# Patient Record
Sex: FEMALE | Race: WHITE | Employment: OTHER | URBAN - METROPOLITAN AREA
[De-identification: names, ages, dates, MRNs, and addresses within clinical notes are randomized per-mention and may not be internally consistent; named-entity substitution may affect disease eponyms.]

---

## 2018-05-26 ENCOUNTER — APPOINTMENT (OUTPATIENT)
Dept: GENERAL RADIOLOGY | Age: 68
End: 2018-05-26
Payer: COMMERCIAL

## 2018-05-26 ENCOUNTER — HOSPITAL ENCOUNTER (EMERGENCY)
Age: 68
Discharge: HOME | End: 2018-05-26
Payer: COMMERCIAL

## 2018-05-26 VITALS
RESPIRATION RATE: 16 BRPM | OXYGEN SATURATION: 95 % | BODY MASS INDEX: 21.17 KG/M2 | HEART RATE: 72 BPM | HEIGHT: 59 IN | DIASTOLIC BLOOD PRESSURE: 69 MMHG | TEMPERATURE: 98.5 F | WEIGHT: 105 LBS | SYSTOLIC BLOOD PRESSURE: 133 MMHG

## 2018-05-26 DIAGNOSIS — S62.102A CLOSED FRACTURE OF LEFT WRIST, INITIAL ENCOUNTER: Primary | ICD-10-CM

## 2018-05-26 PROCEDURE — 6360000002 HC RX W HCPCS: Performed by: PHYSICIAN ASSISTANT

## 2018-05-26 PROCEDURE — 73100 X-RAY EXAM OF WRIST: CPT

## 2018-05-26 PROCEDURE — 6360000002 HC RX W HCPCS

## 2018-05-26 PROCEDURE — 73110 X-RAY EXAM OF WRIST: CPT

## 2018-05-26 PROCEDURE — 96372 THER/PROPH/DIAG INJ SC/IM: CPT

## 2018-05-26 PROCEDURE — 99284 EMERGENCY DEPT VISIT MOD MDM: CPT

## 2018-05-26 PROCEDURE — 6370000000 HC RX 637 (ALT 250 FOR IP): Performed by: PHYSICIAN ASSISTANT

## 2018-05-26 PROCEDURE — 2500000003 HC RX 250 WO HCPCS: Performed by: PHYSICIAN ASSISTANT

## 2018-05-26 RX ORDER — IBUPROFEN 400 MG/1
400 TABLET ORAL EVERY 6 HOURS PRN
COMMUNITY

## 2018-05-26 RX ORDER — BUPIVACAINE HYDROCHLORIDE 2.5 MG/ML
10 INJECTION, SOLUTION EPIDURAL; INFILTRATION; INTRACAUDAL ONCE
Status: COMPLETED | OUTPATIENT
Start: 2018-05-26 | End: 2018-05-26

## 2018-05-26 RX ORDER — FENTANYL CITRATE 50 UG/ML
INJECTION, SOLUTION INTRAMUSCULAR; INTRAVENOUS
Status: COMPLETED
Start: 2018-05-26 | End: 2018-05-26

## 2018-05-26 RX ORDER — FENTANYL CITRATE 50 UG/ML
50 INJECTION, SOLUTION INTRAMUSCULAR; INTRAVENOUS ONCE
Status: COMPLETED | OUTPATIENT
Start: 2018-05-26 | End: 2018-05-26

## 2018-05-26 RX ORDER — SUCRALFATE 1 G/1
1 TABLET ORAL 4 TIMES DAILY
COMMUNITY

## 2018-05-26 RX ORDER — ONDANSETRON 4 MG/1
4 TABLET, ORALLY DISINTEGRATING ORAL ONCE
Status: COMPLETED | OUTPATIENT
Start: 2018-05-26 | End: 2018-05-26

## 2018-05-26 RX ORDER — TRAMADOL HYDROCHLORIDE 50 MG/1
50 TABLET ORAL EVERY 6 HOURS PRN
Qty: 20 TABLET | Refills: 0 | Status: ON HOLD | OUTPATIENT
Start: 2018-05-26 | End: 2018-05-30

## 2018-05-26 RX ORDER — MORPHINE SULFATE 2 MG/ML
6 INJECTION, SOLUTION INTRAMUSCULAR; INTRAVENOUS ONCE
Status: COMPLETED | OUTPATIENT
Start: 2018-05-26 | End: 2018-05-26

## 2018-05-26 RX ORDER — OXYCODONE HYDROCHLORIDE AND ACETAMINOPHEN 5; 325 MG/1; MG/1
1 TABLET ORAL ONCE
Status: DISCONTINUED | OUTPATIENT
Start: 2018-05-26 | End: 2018-05-26

## 2018-05-26 RX ADMIN — BUPIVACAINE HYDROCHLORIDE 25 MG: 2.5 INJECTION, SOLUTION EPIDURAL; INFILTRATION; INTRACAUDAL; PERINEURAL at 16:35

## 2018-05-26 RX ADMIN — FENTANYL CITRATE 50 MCG: 50 INJECTION, SOLUTION INTRAMUSCULAR; INTRAVENOUS at 17:16

## 2018-05-26 RX ADMIN — ONDANSETRON 4 MG: 4 TABLET, ORALLY DISINTEGRATING ORAL at 16:21

## 2018-05-26 RX ADMIN — MORPHINE SULFATE 6 MG: 2 INJECTION, SOLUTION INTRAMUSCULAR; INTRAVENOUS at 16:34

## 2018-05-26 ASSESSMENT — PAIN DESCRIPTION - LOCATION
LOCATION: WRIST
LOCATION: WRIST

## 2018-05-26 ASSESSMENT — PAIN DESCRIPTION - FREQUENCY: FREQUENCY: CONTINUOUS

## 2018-05-26 ASSESSMENT — PAIN SCALES - GENERAL
PAINLEVEL_OUTOF10: 3
PAINLEVEL_OUTOF10: 10
PAINLEVEL_OUTOF10: 8
PAINLEVEL_OUTOF10: 10

## 2018-05-26 ASSESSMENT — PAIN DESCRIPTION - DESCRIPTORS: DESCRIPTORS: ACHING

## 2018-05-26 ASSESSMENT — PAIN DESCRIPTION - PAIN TYPE
TYPE: ACUTE PAIN
TYPE: ACUTE PAIN

## 2018-05-26 ASSESSMENT — PAIN DESCRIPTION - ORIENTATION
ORIENTATION: LEFT
ORIENTATION: LEFT

## 2018-05-26 ASSESSMENT — PAIN DESCRIPTION - ONSET: ONSET: SUDDEN

## 2018-05-29 ENCOUNTER — PREP FOR PROCEDURE (OUTPATIENT)
Dept: ORTHOPEDIC SURGERY | Age: 68
End: 2018-05-29

## 2018-05-29 ENCOUNTER — ANESTHESIA EVENT (OUTPATIENT)
Dept: OPERATING ROOM | Age: 68
End: 2018-05-29
Payer: COMMERCIAL

## 2018-05-29 ENCOUNTER — OFFICE VISIT (OUTPATIENT)
Dept: ORTHOPEDIC SURGERY | Age: 68
End: 2018-05-29
Payer: COMMERCIAL

## 2018-05-29 VITALS
SYSTOLIC BLOOD PRESSURE: 148 MMHG | DIASTOLIC BLOOD PRESSURE: 72 MMHG | TEMPERATURE: 98.5 F | RESPIRATION RATE: 20 BRPM | WEIGHT: 105 LBS | HEART RATE: 62 BPM | BODY MASS INDEX: 21.17 KG/M2 | HEIGHT: 59 IN

## 2018-05-29 DIAGNOSIS — S52.502A CLOSED TRAUMATIC DISPLACED FRACTURE OF DISTAL END OF LEFT RADIUS, INITIAL ENCOUNTER: Primary | ICD-10-CM

## 2018-05-29 PROCEDURE — 99203 OFFICE O/P NEW LOW 30 MIN: CPT | Performed by: ORTHOPAEDIC SURGERY

## 2018-05-29 RX ORDER — SODIUM CHLORIDE 0.9 % (FLUSH) 0.9 %
10 SYRINGE (ML) INJECTION EVERY 12 HOURS SCHEDULED
Status: CANCELLED | OUTPATIENT
Start: 2018-05-29

## 2018-05-29 RX ORDER — SODIUM CHLORIDE 9 MG/ML
INJECTION, SOLUTION INTRAVENOUS CONTINUOUS
Status: CANCELLED | OUTPATIENT
Start: 2018-05-29

## 2018-05-29 RX ORDER — SODIUM CHLORIDE 0.9 % (FLUSH) 0.9 %
10 SYRINGE (ML) INJECTION PRN
Status: CANCELLED | OUTPATIENT
Start: 2018-05-29

## 2018-05-30 ENCOUNTER — ANESTHESIA (OUTPATIENT)
Dept: OPERATING ROOM | Age: 68
End: 2018-05-30
Payer: COMMERCIAL

## 2018-05-30 ENCOUNTER — HOSPITAL ENCOUNTER (OUTPATIENT)
Age: 68
Setting detail: OUTPATIENT SURGERY
Discharge: HOME | End: 2018-05-30
Attending: ORTHOPAEDIC SURGERY | Admitting: ORTHOPAEDIC SURGERY
Payer: COMMERCIAL

## 2018-05-30 ENCOUNTER — HOSPITAL ENCOUNTER (OUTPATIENT)
Dept: GENERAL RADIOLOGY | Age: 68
Discharge: HOME | End: 2018-06-01
Attending: ORTHOPAEDIC SURGERY
Payer: COMMERCIAL

## 2018-05-30 VITALS
TEMPERATURE: 98.2 F | BODY MASS INDEX: 21.17 KG/M2 | HEIGHT: 59 IN | WEIGHT: 105 LBS | SYSTOLIC BLOOD PRESSURE: 169 MMHG | RESPIRATION RATE: 20 BRPM | OXYGEN SATURATION: 98 % | HEART RATE: 67 BPM | DIASTOLIC BLOOD PRESSURE: 70 MMHG

## 2018-05-30 VITALS
DIASTOLIC BLOOD PRESSURE: 119 MMHG | TEMPERATURE: 95.7 F | SYSTOLIC BLOOD PRESSURE: 176 MMHG | OXYGEN SATURATION: 99 % | RESPIRATION RATE: 1 BRPM

## 2018-05-30 DIAGNOSIS — R52 PAIN: ICD-10-CM

## 2018-05-30 DIAGNOSIS — S62.102A CLOSED FRACTURE OF LEFT WRIST, INITIAL ENCOUNTER: ICD-10-CM

## 2018-05-30 LAB
ANION GAP SERPL CALCULATED.3IONS-SCNC: 13 MMOL/L (ref 7–16)
BUN BLDV-MCNC: 13 MG/DL (ref 8–23)
CALCIUM SERPL-MCNC: 9.1 MG/DL (ref 8.6–10.2)
CHLORIDE BLD-SCNC: 103 MMOL/L (ref 98–107)
CO2: 23 MMOL/L (ref 22–29)
CREAT SERPL-MCNC: 0.6 MG/DL (ref 0.5–1)
EKG ATRIAL RATE: 52 BPM
EKG P AXIS: 20 DEGREES
EKG P-R INTERVAL: 178 MS
EKG Q-T INTERVAL: 456 MS
EKG QRS DURATION: 90 MS
EKG QTC CALCULATION (BAZETT): 424 MS
EKG R AXIS: -13 DEGREES
EKG T AXIS: 5 DEGREES
EKG VENTRICULAR RATE: 52 BPM
GFR AFRICAN AMERICAN: >60
GFR NON-AFRICAN AMERICAN: >60 ML/MIN/1.73
GLUCOSE BLD-MCNC: 97 MG/DL (ref 74–109)
HCT VFR BLD CALC: 37.8 % (ref 34–48)
HEMOGLOBIN: 12.5 G/DL (ref 11.5–15.5)
MCH RBC QN AUTO: 31.1 PG (ref 26–35)
MCHC RBC AUTO-ENTMCNC: 33.1 % (ref 32–34.5)
MCV RBC AUTO: 94 FL (ref 80–99.9)
PDW BLD-RTO: 13.6 FL (ref 11.5–15)
PLATELET # BLD: 213 E9/L (ref 130–450)
PMV BLD AUTO: 10.4 FL (ref 7–12)
POTASSIUM REFLEX MAGNESIUM: 4 MMOL/L (ref 3.5–5)
RBC # BLD: 4.02 E12/L (ref 3.5–5.5)
SODIUM BLD-SCNC: 139 MMOL/L (ref 132–146)
WBC # BLD: 6.7 E9/L (ref 4.5–11.5)

## 2018-05-30 PROCEDURE — 25609 OPTX DST RD XART FX/EP SEP3+: CPT | Performed by: PHYSICIAN ASSISTANT

## 2018-05-30 PROCEDURE — 7100000000 HC PACU RECOVERY - FIRST 15 MIN: Performed by: ORTHOPAEDIC SURGERY

## 2018-05-30 PROCEDURE — 80048 BASIC METABOLIC PNL TOTAL CA: CPT

## 2018-05-30 PROCEDURE — 6360000002 HC RX W HCPCS: Performed by: ANESTHESIOLOGY

## 2018-05-30 PROCEDURE — 6360000002 HC RX W HCPCS

## 2018-05-30 PROCEDURE — 3600000013 HC SURGERY LEVEL 3 ADDTL 15MIN: Performed by: ORTHOPAEDIC SURGERY

## 2018-05-30 PROCEDURE — 25609 OPTX DST RD XART FX/EP SEP3+: CPT | Performed by: ORTHOPAEDIC SURGERY

## 2018-05-30 PROCEDURE — 3600000003 HC SURGERY LEVEL 3 BASE: Performed by: ORTHOPAEDIC SURGERY

## 2018-05-30 PROCEDURE — 3700000000 HC ANESTHESIA ATTENDED CARE: Performed by: ORTHOPAEDIC SURGERY

## 2018-05-30 PROCEDURE — A4565 SLINGS: HCPCS | Performed by: ORTHOPAEDIC SURGERY

## 2018-05-30 PROCEDURE — 36415 COLL VENOUS BLD VENIPUNCTURE: CPT

## 2018-05-30 PROCEDURE — 2709999900 HC NON-CHARGEABLE SUPPLY: Performed by: ORTHOPAEDIC SURGERY

## 2018-05-30 PROCEDURE — 2580000003 HC RX 258: Performed by: PHYSICIAN ASSISTANT

## 2018-05-30 PROCEDURE — 7100000011 HC PHASE II RECOVERY - ADDTL 15 MIN: Performed by: ORTHOPAEDIC SURGERY

## 2018-05-30 PROCEDURE — 85027 COMPLETE CBC AUTOMATED: CPT

## 2018-05-30 PROCEDURE — 3700000001 HC ADD 15 MINUTES (ANESTHESIA): Performed by: ORTHOPAEDIC SURGERY

## 2018-05-30 PROCEDURE — 6370000000 HC RX 637 (ALT 250 FOR IP): Performed by: ANESTHESIOLOGY

## 2018-05-30 PROCEDURE — 2500000003 HC RX 250 WO HCPCS: Performed by: ORTHOPAEDIC SURGERY

## 2018-05-30 PROCEDURE — 3209999900 FLUORO FOR SURGICAL PROCEDURES

## 2018-05-30 PROCEDURE — 2500000003 HC RX 250 WO HCPCS

## 2018-05-30 PROCEDURE — 7100000001 HC PACU RECOVERY - ADDTL 15 MIN: Performed by: ORTHOPAEDIC SURGERY

## 2018-05-30 PROCEDURE — 6360000002 HC RX W HCPCS: Performed by: PHYSICIAN ASSISTANT

## 2018-05-30 PROCEDURE — C1713 ANCHOR/SCREW BN/BN,TIS/BN: HCPCS | Performed by: ORTHOPAEDIC SURGERY

## 2018-05-30 PROCEDURE — 7100000010 HC PHASE II RECOVERY - FIRST 15 MIN: Performed by: ORTHOPAEDIC SURGERY

## 2018-05-30 PROCEDURE — 2720000010 HC SURG SUPPLY STERILE: Performed by: ORTHOPAEDIC SURGERY

## 2018-05-30 DEVICE — SCREW BNE L18MM DIA2.7MM DST RAD LOK FULL THRD SQ DRV HD LO: Type: IMPLANTABLE DEVICE | Site: WRIST | Status: FUNCTIONAL

## 2018-05-30 DEVICE — SCREW BONE L11MM D2.7MM DST RDL NNLCKNG FLLY THRDD SQRE DRIV: Type: IMPLANTABLE DEVICE | Site: WRIST | Status: FUNCTIONAL

## 2018-05-30 DEVICE — SCREW BNE L12MM DIA2.7MM DST RAD NONLOCKING FULL THRD SQ: Type: IMPLANTABLE DEVICE | Site: WRIST | Status: FUNCTIONAL

## 2018-05-30 DEVICE — PEG BNE FIX L16MM DIA2.2MM DST VOLAR RAD SMOOTH LOK FOR DVR: Type: IMPLANTABLE DEVICE | Site: WRIST | Status: FUNCTIONAL

## 2018-05-30 DEVICE — PLATE BNE W22XL51MM 12 H L DST RAD TI LOK COMPR LO PROF NAR: Type: IMPLANTABLE DEVICE | Site: WRIST | Status: FUNCTIONAL

## 2018-05-30 DEVICE — SCREW BNE L16MM DIA2.7MM DST RAD LOK FULL THRD SQ DRV HD LO: Type: IMPLANTABLE DEVICE | Site: WRIST | Status: FUNCTIONAL

## 2018-05-30 DEVICE — IMPLANTABLE DEVICE
Type: IMPLANTABLE DEVICE | Site: WRIST | Status: FUNCTIONAL
Brand: CORTICAL LOCK SCREW

## 2018-05-30 RX ORDER — DIPHENHYDRAMINE HYDROCHLORIDE 50 MG/ML
12.5 INJECTION INTRAMUSCULAR; INTRAVENOUS
Status: DISCONTINUED | OUTPATIENT
Start: 2018-05-30 | End: 2018-05-30 | Stop reason: HOSPADM

## 2018-05-30 RX ORDER — FENTANYL CITRATE 50 UG/ML
50 INJECTION, SOLUTION INTRAMUSCULAR; INTRAVENOUS EVERY 5 MIN PRN
Status: COMPLETED | OUTPATIENT
Start: 2018-05-30 | End: 2018-05-30

## 2018-05-30 RX ORDER — FENTANYL CITRATE 50 UG/ML
25 INJECTION, SOLUTION INTRAMUSCULAR; INTRAVENOUS EVERY 5 MIN PRN
Status: DISCONTINUED | OUTPATIENT
Start: 2018-05-30 | End: 2018-05-30 | Stop reason: HOSPADM

## 2018-05-30 RX ORDER — TRAMADOL HYDROCHLORIDE 50 MG/1
50 TABLET ORAL EVERY 6 HOURS PRN
Qty: 28 TABLET | Refills: 0 | Status: SHIPPED | OUTPATIENT
Start: 2018-05-30 | End: 2018-06-11 | Stop reason: SDUPTHER

## 2018-05-30 RX ORDER — KETAMINE HYDROCHLORIDE 10 MG/ML
INJECTION, SOLUTION INTRAMUSCULAR; INTRAVENOUS PRN
Status: DISCONTINUED | OUTPATIENT
Start: 2018-05-30 | End: 2018-05-30 | Stop reason: SDUPTHER

## 2018-05-30 RX ORDER — LIDOCAINE HYDROCHLORIDE 20 MG/ML
INJECTION, SOLUTION INFILTRATION; PERINEURAL PRN
Status: DISCONTINUED | OUTPATIENT
Start: 2018-05-30 | End: 2018-05-30 | Stop reason: SDUPTHER

## 2018-05-30 RX ORDER — ROCURONIUM BROMIDE 10 MG/ML
INJECTION, SOLUTION INTRAVENOUS PRN
Status: DISCONTINUED | OUTPATIENT
Start: 2018-05-30 | End: 2018-05-30 | Stop reason: SDUPTHER

## 2018-05-30 RX ORDER — BUPIVACAINE HYDROCHLORIDE AND EPINEPHRINE 5; 5 MG/ML; UG/ML
INJECTION, SOLUTION EPIDURAL; INTRACAUDAL; PERINEURAL PRN
Status: DISCONTINUED | OUTPATIENT
Start: 2018-05-30 | End: 2018-05-30 | Stop reason: HOSPADM

## 2018-05-30 RX ORDER — SODIUM CHLORIDE 9 MG/ML
INJECTION, SOLUTION INTRAVENOUS CONTINUOUS
Status: DISCONTINUED | OUTPATIENT
Start: 2018-05-30 | End: 2018-05-30 | Stop reason: HOSPADM

## 2018-05-30 RX ORDER — ONDANSETRON 2 MG/ML
INJECTION INTRAMUSCULAR; INTRAVENOUS PRN
Status: DISCONTINUED | OUTPATIENT
Start: 2018-05-30 | End: 2018-05-30 | Stop reason: SDUPTHER

## 2018-05-30 RX ORDER — OXYCODONE HYDROCHLORIDE AND ACETAMINOPHEN 5; 325 MG/1; MG/1
1 TABLET ORAL
Status: DISCONTINUED | OUTPATIENT
Start: 2018-05-30 | End: 2018-05-30

## 2018-05-30 RX ORDER — SODIUM CHLORIDE 0.9 % (FLUSH) 0.9 %
10 SYRINGE (ML) INJECTION PRN
Status: DISCONTINUED | OUTPATIENT
Start: 2018-05-30 | End: 2018-05-30 | Stop reason: HOSPADM

## 2018-05-30 RX ORDER — MEPERIDINE HYDROCHLORIDE 25 MG/ML
12.5 INJECTION INTRAMUSCULAR; INTRAVENOUS; SUBCUTANEOUS EVERY 5 MIN PRN
Status: DISCONTINUED | OUTPATIENT
Start: 2018-05-30 | End: 2018-05-30 | Stop reason: HOSPADM

## 2018-05-30 RX ORDER — MIDAZOLAM HYDROCHLORIDE 1 MG/ML
INJECTION INTRAMUSCULAR; INTRAVENOUS PRN
Status: DISCONTINUED | OUTPATIENT
Start: 2018-05-30 | End: 2018-05-30 | Stop reason: SDUPTHER

## 2018-05-30 RX ORDER — PROMETHAZINE HYDROCHLORIDE 25 MG/ML
6.25 INJECTION, SOLUTION INTRAMUSCULAR; INTRAVENOUS
Status: DISCONTINUED | OUTPATIENT
Start: 2018-05-30 | End: 2018-05-30 | Stop reason: HOSPADM

## 2018-05-30 RX ORDER — GLYCOPYRROLATE 0.2 MG/ML
INJECTION INTRAMUSCULAR; INTRAVENOUS PRN
Status: DISCONTINUED | OUTPATIENT
Start: 2018-05-30 | End: 2018-05-30 | Stop reason: SDUPTHER

## 2018-05-30 RX ORDER — FENTANYL CITRATE 50 UG/ML
INJECTION, SOLUTION INTRAMUSCULAR; INTRAVENOUS PRN
Status: DISCONTINUED | OUTPATIENT
Start: 2018-05-30 | End: 2018-05-30 | Stop reason: SDUPTHER

## 2018-05-30 RX ORDER — PROPOFOL 10 MG/ML
INJECTION, EMULSION INTRAVENOUS PRN
Status: DISCONTINUED | OUTPATIENT
Start: 2018-05-30 | End: 2018-05-30 | Stop reason: SDUPTHER

## 2018-05-30 RX ORDER — NEOSTIGMINE METHYLSULFATE 1 MG/ML
INJECTION, SOLUTION INTRAVENOUS PRN
Status: DISCONTINUED | OUTPATIENT
Start: 2018-05-30 | End: 2018-05-30 | Stop reason: SDUPTHER

## 2018-05-30 RX ORDER — DEXAMETHASONE SODIUM PHOSPHATE 4 MG/ML
INJECTION, SOLUTION INTRA-ARTICULAR; INTRALESIONAL; INTRAMUSCULAR; INTRAVENOUS; SOFT TISSUE PRN
Status: DISCONTINUED | OUTPATIENT
Start: 2018-05-30 | End: 2018-05-30 | Stop reason: SDUPTHER

## 2018-05-30 RX ORDER — SODIUM CHLORIDE 0.9 % (FLUSH) 0.9 %
10 SYRINGE (ML) INJECTION EVERY 12 HOURS SCHEDULED
Status: DISCONTINUED | OUTPATIENT
Start: 2018-05-30 | End: 2018-05-30 | Stop reason: HOSPADM

## 2018-05-30 RX ORDER — TRAMADOL HYDROCHLORIDE 50 MG/1
50 TABLET ORAL ONCE
Status: COMPLETED | OUTPATIENT
Start: 2018-05-30 | End: 2018-05-30

## 2018-05-30 RX ADMIN — FENTANYL CITRATE 50 MCG: 50 INJECTION, SOLUTION INTRAMUSCULAR; INTRAVENOUS at 14:02

## 2018-05-30 RX ADMIN — FENTANYL CITRATE 50 MCG: 50 INJECTION, SOLUTION INTRAMUSCULAR; INTRAVENOUS at 13:24

## 2018-05-30 RX ADMIN — ONDANSETRON 4 MG: 2 INJECTION, SOLUTION INTRAMUSCULAR; INTRAVENOUS at 12:26

## 2018-05-30 RX ADMIN — FENTANYL CITRATE 100 MCG: 50 INJECTION, SOLUTION INTRAMUSCULAR; INTRAVENOUS at 12:09

## 2018-05-30 RX ADMIN — KETAMINE HYDROCHLORIDE 20 MG: 10 INJECTION INTRAMUSCULAR; INTRAVENOUS at 12:12

## 2018-05-30 RX ADMIN — CEFAZOLIN SODIUM 2 G: 2 SOLUTION INTRAVENOUS at 12:05

## 2018-05-30 RX ADMIN — MIDAZOLAM 2 MG: 1 INJECTION INTRAMUSCULAR; INTRAVENOUS at 12:05

## 2018-05-30 RX ADMIN — SODIUM CHLORIDE: 9 INJECTION, SOLUTION INTRAVENOUS at 09:02

## 2018-05-30 RX ADMIN — PROPOFOL 100 MG: 10 INJECTION, EMULSION INTRAVENOUS at 12:09

## 2018-05-30 RX ADMIN — FENTANYL CITRATE 50 MCG: 50 INJECTION, SOLUTION INTRAMUSCULAR; INTRAVENOUS at 13:48

## 2018-05-30 RX ADMIN — Medication 0.4 MG: at 13:11

## 2018-05-30 RX ADMIN — FENTANYL CITRATE 50 MCG: 50 INJECTION, SOLUTION INTRAMUSCULAR; INTRAVENOUS at 13:33

## 2018-05-30 RX ADMIN — Medication 2 MG: at 13:11

## 2018-05-30 RX ADMIN — LIDOCAINE HYDROCHLORIDE 60 MG: 20 INJECTION, SOLUTION INFILTRATION; PERINEURAL at 12:09

## 2018-05-30 RX ADMIN — ROCURONIUM BROMIDE 30 MG: 10 INJECTION, SOLUTION INTRAVENOUS at 12:09

## 2018-05-30 RX ADMIN — FENTANYL CITRATE 50 MCG: 50 INJECTION, SOLUTION INTRAMUSCULAR; INTRAVENOUS at 13:40

## 2018-05-30 RX ADMIN — TRAMADOL HYDROCHLORIDE 50 MG: 50 TABLET, FILM COATED ORAL at 15:26

## 2018-05-30 RX ADMIN — DEXAMETHASONE SODIUM PHOSPHATE 8 MG: 4 INJECTION, SOLUTION INTRA-ARTICULAR; INTRALESIONAL; INTRAMUSCULAR; INTRAVENOUS; SOFT TISSUE at 12:09

## 2018-05-30 RX ADMIN — SODIUM CHLORIDE: 9 INJECTION, SOLUTION INTRAVENOUS at 12:05

## 2018-05-30 ASSESSMENT — PULMONARY FUNCTION TESTS
PIF_VALUE: 3
PIF_VALUE: 23
PIF_VALUE: 23
PIF_VALUE: 0
PIF_VALUE: 21
PIF_VALUE: 20
PIF_VALUE: 21
PIF_VALUE: 21
PIF_VALUE: 23
PIF_VALUE: 21
PIF_VALUE: 21
PIF_VALUE: 23
PIF_VALUE: 2
PIF_VALUE: 16
PIF_VALUE: 21
PIF_VALUE: 26
PIF_VALUE: 21
PIF_VALUE: 23
PIF_VALUE: 20
PIF_VALUE: 21
PIF_VALUE: 20
PIF_VALUE: 21
PIF_VALUE: 20
PIF_VALUE: 23
PIF_VALUE: 21
PIF_VALUE: 17
PIF_VALUE: 25
PIF_VALUE: 23
PIF_VALUE: 15
PIF_VALUE: 21
PIF_VALUE: 21
PIF_VALUE: 20
PIF_VALUE: 20
PIF_VALUE: 21
PIF_VALUE: 1
PIF_VALUE: 21
PIF_VALUE: 23
PIF_VALUE: 21
PIF_VALUE: 23
PIF_VALUE: 21
PIF_VALUE: 21
PIF_VALUE: 20
PIF_VALUE: 5
PIF_VALUE: 20
PIF_VALUE: 20
PIF_VALUE: 21
PIF_VALUE: 20
PIF_VALUE: 1
PIF_VALUE: 21
PIF_VALUE: 3
PIF_VALUE: 21
PIF_VALUE: 1
PIF_VALUE: 21
PIF_VALUE: 21
PIF_VALUE: 1
PIF_VALUE: 23
PIF_VALUE: 20
PIF_VALUE: 17
PIF_VALUE: 21
PIF_VALUE: 23
PIF_VALUE: 4

## 2018-05-30 ASSESSMENT — PAIN DESCRIPTION - DESCRIPTORS
DESCRIPTORS: DISCOMFORT;ACHING
DESCRIPTORS: DISCOMFORT

## 2018-05-30 ASSESSMENT — PAIN DESCRIPTION - LOCATION
LOCATION: ARM

## 2018-05-30 ASSESSMENT — PAIN DESCRIPTION - PAIN TYPE
TYPE: SURGICAL PAIN

## 2018-05-30 ASSESSMENT — PAIN DESCRIPTION - PROGRESSION
CLINICAL_PROGRESSION: GRADUALLY IMPROVING
CLINICAL_PROGRESSION: GRADUALLY IMPROVING

## 2018-05-30 ASSESSMENT — PAIN SCALES - GENERAL
PAINLEVEL_OUTOF10: 6
PAINLEVEL_OUTOF10: 8
PAINLEVEL_OUTOF10: 5
PAINLEVEL_OUTOF10: 8
PAINLEVEL_OUTOF10: 7
PAINLEVEL_OUTOF10: 10
PAINLEVEL_OUTOF10: 7
PAINLEVEL_OUTOF10: 7

## 2018-05-30 ASSESSMENT — LIFESTYLE VARIABLES: SMOKING_STATUS: 0

## 2018-05-30 ASSESSMENT — PAIN DESCRIPTION - ORIENTATION
ORIENTATION: LEFT

## 2018-05-30 ASSESSMENT — PAIN DESCRIPTION - FREQUENCY: FREQUENCY: CONTINUOUS

## 2018-05-30 ASSESSMENT — PAIN - FUNCTIONAL ASSESSMENT: PAIN_FUNCTIONAL_ASSESSMENT: 0-10

## 2018-05-30 ASSESSMENT — PAIN DESCRIPTION - ONSET: ONSET: ON-GOING

## 2018-06-08 ENCOUNTER — TELEPHONE (OUTPATIENT)
Dept: ORTHOPEDIC SURGERY | Age: 68
End: 2018-06-08

## 2018-06-08 DIAGNOSIS — S52.502A CLOSED TRAUMATIC DISPLACED FRACTURE OF DISTAL END OF LEFT RADIUS, INITIAL ENCOUNTER: Primary | ICD-10-CM

## 2018-06-11 ENCOUNTER — OFFICE VISIT (OUTPATIENT)
Dept: ORTHOPEDIC SURGERY | Age: 68
End: 2018-06-11
Payer: COMMERCIAL

## 2018-06-11 VITALS
SYSTOLIC BLOOD PRESSURE: 145 MMHG | TEMPERATURE: 98.6 F | DIASTOLIC BLOOD PRESSURE: 74 MMHG | HEART RATE: 59 BPM | RESPIRATION RATE: 16 BRPM

## 2018-06-11 DIAGNOSIS — S62.102A CLOSED FRACTURE OF LEFT WRIST, INITIAL ENCOUNTER: ICD-10-CM

## 2018-06-11 DIAGNOSIS — S52.502A CLOSED TRAUMATIC DISPLACED FRACTURE OF DISTAL END OF LEFT RADIUS, INITIAL ENCOUNTER: Primary | ICD-10-CM

## 2018-06-11 PROCEDURE — L3908 WHO COCK-UP NONMOLDE PRE OTS: HCPCS | Performed by: ORTHOPAEDIC SURGERY

## 2018-06-11 PROCEDURE — 99024 POSTOP FOLLOW-UP VISIT: CPT | Performed by: ORTHOPAEDIC SURGERY

## 2018-06-11 RX ORDER — ESTRADIOL 0.05 MG/D
PATCH TRANSDERMAL
COMMUNITY
Start: 2018-06-07

## 2018-06-11 RX ORDER — TRAMADOL HYDROCHLORIDE 50 MG/1
50 TABLET ORAL EVERY 6 HOURS PRN
Qty: 28 TABLET | Refills: 0 | Status: SHIPPED | OUTPATIENT
Start: 2018-06-11 | End: 2018-06-18

## 2018-07-10 ENCOUNTER — OFFICE VISIT (OUTPATIENT)
Dept: ORTHOPEDIC SURGERY | Age: 68
End: 2018-07-10

## 2018-07-10 VITALS
SYSTOLIC BLOOD PRESSURE: 125 MMHG | TEMPERATURE: 98.5 F | DIASTOLIC BLOOD PRESSURE: 68 MMHG | HEIGHT: 59 IN | BODY MASS INDEX: 21.17 KG/M2 | RESPIRATION RATE: 16 BRPM | WEIGHT: 105 LBS | HEART RATE: 62 BPM

## 2018-07-10 DIAGNOSIS — S52.502A CLOSED TRAUMATIC DISPLACED FRACTURE OF DISTAL END OF LEFT RADIUS, INITIAL ENCOUNTER: Primary | ICD-10-CM

## 2018-07-10 PROCEDURE — 99024 POSTOP FOLLOW-UP VISIT: CPT | Performed by: ORTHOPAEDIC SURGERY

## 2018-07-10 RX ORDER — DILTIAZEM HYDROCHLORIDE 300 MG/1
CAPSULE, COATED, EXTENDED RELEASE ORAL
Status: ON HOLD | COMMUNITY
Start: 2018-06-19 | End: 2022-06-24

## 2018-08-13 ENCOUNTER — OFFICE VISIT (OUTPATIENT)
Dept: ORTHOPEDIC SURGERY | Age: 68
End: 2018-08-13

## 2018-08-13 VITALS
HEIGHT: 59 IN | BODY MASS INDEX: 21.17 KG/M2 | WEIGHT: 105 LBS | RESPIRATION RATE: 16 BRPM | SYSTOLIC BLOOD PRESSURE: 130 MMHG | HEART RATE: 58 BPM | DIASTOLIC BLOOD PRESSURE: 71 MMHG | TEMPERATURE: 98.6 F

## 2018-08-13 DIAGNOSIS — S52.502A CLOSED TRAUMATIC DISPLACED FRACTURE OF DISTAL END OF LEFT RADIUS, INITIAL ENCOUNTER: Primary | ICD-10-CM

## 2018-08-13 PROCEDURE — 99024 POSTOP FOLLOW-UP VISIT: CPT | Performed by: ORTHOPAEDIC SURGERY

## 2018-08-13 NOTE — PROGRESS NOTES
Department of Orthopedic Hand Office note    10 weeks out left distal radius ORIF (DOS 5/30/18) doing well. She has been working with OT for the past 4 weeks and has been wearing her wrist brace for the past 3-4 weeks. supination has greatly improved    Vitals:    08/13/18 1421   BP: 130/71   Pulse: 58   Resp: 16   Temp: 98.6 °F (37 °C)         Physical exam: Scar mature. Full thumb index middle ring and small finger flexion and extension. Full pronation. She has significantly improved to near full supination at this time. . Wrist flexion extension approximately 15° and 20° respectively. Otherwise remains neurovascular intact. X-rays Obtained in the office today: AP, lateral, obliques left wrist demonstrate stable internal fixation of distal radius fracture. No interval displacement of the fracture. Hardware stable. Impression office x-rays: Stable alignment left distal radius fracture with volar plate fixation    Assessment: 10 weeks postop left distal radius ORIF doing well with pronation contracture    Plan    Patient was referred to therapy. Continue to improve range of motion focusing on supination and wrist extension. May progress with strengthening as tolerated. Modalities as needed. Can discontinue brace. Can follow up PRN      I have seen and evaluated the patient and agree with the above assessment and plan on today's visit. I have performed the key components of the history and physical examination with significant findings of postop. I concur with the findings and plan as documented.     Batsheva Askew MD  8/13/2018

## 2018-08-30 ENCOUNTER — OFFICE VISIT (OUTPATIENT)
Dept: CARDIOLOGY CLINIC | Age: 68
End: 2018-08-30
Payer: COMMERCIAL

## 2018-08-30 VITALS
BODY MASS INDEX: 21.51 KG/M2 | DIASTOLIC BLOOD PRESSURE: 60 MMHG | HEART RATE: 53 BPM | RESPIRATION RATE: 16 BRPM | WEIGHT: 106.7 LBS | SYSTOLIC BLOOD PRESSURE: 134 MMHG | HEIGHT: 59 IN

## 2018-08-30 DIAGNOSIS — I10 HYPERTENSION, UNSPECIFIED TYPE: Primary | ICD-10-CM

## 2018-08-30 PROCEDURE — 99213 OFFICE O/P EST LOW 20 MIN: CPT | Performed by: INTERNAL MEDICINE

## 2018-08-30 PROCEDURE — 93000 ELECTROCARDIOGRAM COMPLETE: CPT | Performed by: INTERNAL MEDICINE

## 2018-08-30 NOTE — PROGRESS NOTES
[facility]  Jordan Mcwilliams M.D. Quentin Hernandez. Shorty Molina M.D. Aly Lock ANJELICA Mccann.  Ava Yarbrough. Eric Pelayo M.D. Leeann Cooper M.D. Jatin Pimentel M.D. Jayla Marie   1950  Danuta Lerma MD      This 69-year-old woman seen for cardiac follow-up today. She has a history of hypertension, hyperlipidemia, and chronic PVCs. Mild coronary artery plaques were demonstrated at catheterization in . Since last seen she required hand surgery for a complex fracture. Medical History:  1. Hypertension. 2. History of palpitations and PVC's. 3. Hyperlipidemia.  triglycerides 146 HDL 76 total cholesterol 229 ()  4. No history of MI, CHF, CVA, CKD. 5. Surgery: Hysterectomy (), oophorectomy (), cholecystectomy (). 6. Allergies/drug intolerance: Statins (myalgias), Zetia/Vytorin (GI), Welchol (GI side effect), Tricor (constipation), Flax seed oil (diarrhea), Niacin (nausea), Lopid (abdominal cramps). Allergies to codeine. 7. Fibromyalgia. 8. Recurrent H. Pylori infection. 9. Cardiac catheterization, 2005, left main normal. LAD, 30-40%. Proximal and mid. Circumflex, mild plaques. Dominant RCA, 50% mid. EF > 70. 10. Loop recorder, [id number], occasional PVC's. 11. Exercise EKG, . 10 METS. Normal.  Normal perfusion. EF 75%. 12. Treadmill nuclear, 2013, NS, normal perfusion. EF 78%. 13. History of spinal stenosis/sciatica. 15. Family history: Father , age 66, MI.  S/P CABG and carotid endarterectomy.  Mother, malignant neoplasm and MI.   13. History of statin associated myalgia (Lipitor)      Review of Systems:  Constitutional: negative for fever and chills  Respiratory: negative for cough and hemoptysis  Cardiovascular:   Gastrointestinal: negative for abdominal pain, diarrhea, nausea and vomiting  Genitourinary:negative for dysuria and hematuria  Derm: negative for rash and skin

## 2022-06-23 ENCOUNTER — APPOINTMENT (OUTPATIENT)
Dept: CT IMAGING | Age: 72
End: 2022-06-23
Payer: MEDICARE

## 2022-06-23 ENCOUNTER — HOSPITAL ENCOUNTER (OUTPATIENT)
Age: 72
Setting detail: OBSERVATION
Discharge: HOME | End: 2022-06-24
Attending: EMERGENCY MEDICINE | Admitting: INTERNAL MEDICINE
Payer: MEDICARE

## 2022-06-23 ENCOUNTER — APPOINTMENT (OUTPATIENT)
Dept: GENERAL RADIOLOGY | Age: 72
End: 2022-06-23
Payer: MEDICARE

## 2022-06-23 DIAGNOSIS — R07.9 CHEST PAIN, UNSPECIFIED TYPE: Primary | ICD-10-CM

## 2022-06-23 LAB
ALBUMIN SERPL-MCNC: 4.6 G/DL (ref 3.5–5.2)
ALP BLD-CCNC: 94 U/L (ref 35–104)
ALT SERPL-CCNC: 34 U/L (ref 0–32)
ANION GAP SERPL CALCULATED.3IONS-SCNC: 13 MMOL/L (ref 7–16)
AST SERPL-CCNC: 31 U/L (ref 0–31)
BASOPHILS ABSOLUTE: 0.08 E9/L (ref 0–0.2)
BASOPHILS RELATIVE PERCENT: 1.3 % (ref 0–2)
BILIRUB SERPL-MCNC: 0.3 MG/DL (ref 0–1.2)
BUN BLDV-MCNC: 13 MG/DL (ref 6–23)
CALCIUM SERPL-MCNC: 9.7 MG/DL (ref 8.6–10.2)
CHLORIDE BLD-SCNC: 101 MMOL/L (ref 98–107)
CO2: 26 MMOL/L (ref 22–29)
CREAT SERPL-MCNC: 0.8 MG/DL (ref 0.5–1)
EKG ATRIAL RATE: 62 BPM
EKG P AXIS: 28 DEGREES
EKG P-R INTERVAL: 172 MS
EKG Q-T INTERVAL: 436 MS
EKG QRS DURATION: 98 MS
EKG QTC CALCULATION (BAZETT): 442 MS
EKG R AXIS: -4 DEGREES
EKG T AXIS: -3 DEGREES
EKG VENTRICULAR RATE: 62 BPM
EOSINOPHILS ABSOLUTE: 0.35 E9/L (ref 0.05–0.5)
EOSINOPHILS RELATIVE PERCENT: 5.8 % (ref 0–6)
GFR AFRICAN AMERICAN: >60
GFR NON-AFRICAN AMERICAN: >60 ML/MIN/1.73
GLUCOSE BLD-MCNC: 108 MG/DL (ref 74–99)
HCT VFR BLD CALC: 43.9 % (ref 34–48)
HEMOGLOBIN: 14.5 G/DL (ref 11.5–15.5)
IMMATURE GRANULOCYTES #: 0.03 E9/L
IMMATURE GRANULOCYTES %: 0.5 % (ref 0–5)
INR BLD: 0.9
LYMPHOCYTES ABSOLUTE: 2.18 E9/L (ref 1.5–4)
LYMPHOCYTES RELATIVE PERCENT: 36 % (ref 20–42)
MAGNESIUM: 1.7 MG/DL (ref 1.6–2.6)
MCH RBC QN AUTO: 31.3 PG (ref 26–35)
MCHC RBC AUTO-ENTMCNC: 33 % (ref 32–34.5)
MCV RBC AUTO: 94.8 FL (ref 80–99.9)
MONOCYTES ABSOLUTE: 0.5 E9/L (ref 0.1–0.95)
MONOCYTES RELATIVE PERCENT: 8.3 % (ref 2–12)
NEUTROPHILS ABSOLUTE: 2.92 E9/L (ref 1.8–7.3)
NEUTROPHILS RELATIVE PERCENT: 48.1 % (ref 43–80)
PDW BLD-RTO: 13.3 FL (ref 11.5–15)
PLATELET # BLD: 193 E9/L (ref 130–450)
PMV BLD AUTO: 9.7 FL (ref 7–12)
POTASSIUM SERPL-SCNC: 4.1 MMOL/L (ref 3.5–5)
PRO-BNP: 89 PG/ML (ref 0–125)
PROTHROMBIN TIME: 10.5 SEC (ref 9.3–12.4)
RBC # BLD: 4.63 E12/L (ref 3.5–5.5)
SARS-COV-2, NAAT: NOT DETECTED
SODIUM BLD-SCNC: 140 MMOL/L (ref 132–146)
TOTAL PROTEIN: 7.9 G/DL (ref 6.4–8.3)
TROPONIN, HIGH SENSITIVITY: <6 NG/L (ref 0–9)
TROPONIN, HIGH SENSITIVITY: <6 NG/L (ref 0–9)
TSH SERPL DL<=0.05 MIU/L-ACNC: 4.77 UIU/ML (ref 0.27–4.2)
WBC # BLD: 6.1 E9/L (ref 4.5–11.5)

## 2022-06-23 PROCEDURE — 6360000002 HC RX W HCPCS

## 2022-06-23 PROCEDURE — 85610 PROTHROMBIN TIME: CPT

## 2022-06-23 PROCEDURE — 96374 THER/PROPH/DIAG INJ IV PUSH: CPT

## 2022-06-23 PROCEDURE — 93005 ELECTROCARDIOGRAM TRACING: CPT | Performed by: PHYSICIAN ASSISTANT

## 2022-06-23 PROCEDURE — 6370000000 HC RX 637 (ALT 250 FOR IP): Performed by: FAMILY MEDICINE

## 2022-06-23 PROCEDURE — 87635 SARS-COV-2 COVID-19 AMP PRB: CPT

## 2022-06-23 PROCEDURE — G0378 HOSPITAL OBSERVATION PER HR: HCPCS

## 2022-06-23 PROCEDURE — 85025 COMPLETE CBC W/AUTO DIFF WBC: CPT

## 2022-06-23 PROCEDURE — 36415 COLL VENOUS BLD VENIPUNCTURE: CPT

## 2022-06-23 PROCEDURE — 84484 ASSAY OF TROPONIN QUANT: CPT

## 2022-06-23 PROCEDURE — 80053 COMPREHEN METABOLIC PANEL: CPT

## 2022-06-23 PROCEDURE — 83880 ASSAY OF NATRIURETIC PEPTIDE: CPT

## 2022-06-23 PROCEDURE — 99285 EMERGENCY DEPT VISIT HI MDM: CPT

## 2022-06-23 PROCEDURE — 2580000003 HC RX 258: Performed by: FAMILY MEDICINE

## 2022-06-23 PROCEDURE — 83735 ASSAY OF MAGNESIUM: CPT

## 2022-06-23 PROCEDURE — 71046 X-RAY EXAM CHEST 2 VIEWS: CPT

## 2022-06-23 PROCEDURE — 70450 CT HEAD/BRAIN W/O DYE: CPT

## 2022-06-23 PROCEDURE — 6370000000 HC RX 637 (ALT 250 FOR IP): Performed by: PHYSICIAN ASSISTANT

## 2022-06-23 PROCEDURE — 84443 ASSAY THYROID STIM HORMONE: CPT

## 2022-06-23 RX ORDER — ACETAMINOPHEN 325 MG/1
650 TABLET ORAL EVERY 6 HOURS PRN
Status: DISCONTINUED | OUTPATIENT
Start: 2022-06-23 | End: 2022-06-24 | Stop reason: HOSPADM

## 2022-06-23 RX ORDER — ATORVASTATIN CALCIUM 40 MG/1
40 TABLET, FILM COATED ORAL NIGHTLY
Status: DISCONTINUED | OUTPATIENT
Start: 2022-06-23 | End: 2022-06-23 | Stop reason: CLARIF

## 2022-06-23 RX ORDER — ACETAMINOPHEN 650 MG/1
650 SUPPOSITORY RECTAL EVERY 6 HOURS PRN
Status: DISCONTINUED | OUTPATIENT
Start: 2022-06-23 | End: 2022-06-24 | Stop reason: HOSPADM

## 2022-06-23 RX ORDER — ATORVASTATIN CALCIUM 40 MG/1
40 TABLET, FILM COATED ORAL NIGHTLY
Status: DISCONTINUED | OUTPATIENT
Start: 2022-06-23 | End: 2022-06-23

## 2022-06-23 RX ORDER — ATENOLOL 25 MG/1
25 TABLET ORAL DAILY
Status: DISCONTINUED | OUTPATIENT
Start: 2022-06-24 | End: 2022-06-24 | Stop reason: HOSPADM

## 2022-06-23 RX ORDER — ONDANSETRON 4 MG/1
4 TABLET, ORALLY DISINTEGRATING ORAL EVERY 8 HOURS PRN
Status: DISCONTINUED | OUTPATIENT
Start: 2022-06-23 | End: 2022-06-24 | Stop reason: HOSPADM

## 2022-06-23 RX ORDER — PRAVASTATIN SODIUM 20 MG
40 TABLET ORAL DAILY
Status: DISCONTINUED | OUTPATIENT
Start: 2022-06-24 | End: 2022-06-24 | Stop reason: HOSPADM

## 2022-06-23 RX ORDER — SODIUM CHLORIDE 0.9 % (FLUSH) 0.9 %
5-40 SYRINGE (ML) INJECTION EVERY 12 HOURS SCHEDULED
Status: DISCONTINUED | OUTPATIENT
Start: 2022-06-23 | End: 2022-06-24 | Stop reason: HOSPADM

## 2022-06-23 RX ORDER — ESTRADIOL 0.05 MG/D
1 PATCH TRANSDERMAL WEEKLY
Status: DISCONTINUED | OUTPATIENT
Start: 2022-06-23 | End: 2022-06-23

## 2022-06-23 RX ORDER — LEVOTHYROXINE SODIUM 0.03 MG/1
25 TABLET ORAL DAILY
Status: DISCONTINUED | OUTPATIENT
Start: 2022-06-24 | End: 2022-06-24 | Stop reason: HOSPADM

## 2022-06-23 RX ORDER — ASPIRIN 81 MG/1
81 TABLET, CHEWABLE ORAL DAILY
Status: DISCONTINUED | OUTPATIENT
Start: 2022-06-24 | End: 2022-06-24 | Stop reason: HOSPADM

## 2022-06-23 RX ORDER — PANTOPRAZOLE SODIUM 40 MG/1
40 TABLET, DELAYED RELEASE ORAL
Status: DISCONTINUED | OUTPATIENT
Start: 2022-06-24 | End: 2022-06-24 | Stop reason: HOSPADM

## 2022-06-23 RX ORDER — ENOXAPARIN SODIUM 100 MG/ML
40 INJECTION SUBCUTANEOUS DAILY
Status: DISCONTINUED | OUTPATIENT
Start: 2022-06-24 | End: 2022-06-24 | Stop reason: HOSPADM

## 2022-06-23 RX ORDER — SUCRALFATE 1 G/1
1 TABLET ORAL 4 TIMES DAILY
Status: DISCONTINUED | OUTPATIENT
Start: 2022-06-23 | End: 2022-06-24 | Stop reason: HOSPADM

## 2022-06-23 RX ORDER — SODIUM CHLORIDE 9 MG/ML
INJECTION, SOLUTION INTRAVENOUS PRN
Status: DISCONTINUED | OUTPATIENT
Start: 2022-06-23 | End: 2022-06-24 | Stop reason: HOSPADM

## 2022-06-23 RX ORDER — ESTRADIOL 0.05 MG/D
1 PATCH TRANSDERMAL WEEKLY
Status: DISCONTINUED | OUTPATIENT
Start: 2022-06-26 | End: 2022-06-24 | Stop reason: HOSPADM

## 2022-06-23 RX ORDER — ONDANSETRON 2 MG/ML
4 INJECTION INTRAMUSCULAR; INTRAVENOUS EVERY 6 HOURS PRN
Status: DISCONTINUED | OUTPATIENT
Start: 2022-06-23 | End: 2022-06-24 | Stop reason: HOSPADM

## 2022-06-23 RX ORDER — HYDRALAZINE HYDROCHLORIDE 20 MG/ML
10 INJECTION INTRAMUSCULAR; INTRAVENOUS EVERY 6 HOURS PRN
Status: DISCONTINUED | OUTPATIENT
Start: 2022-06-23 | End: 2022-06-24 | Stop reason: HOSPADM

## 2022-06-23 RX ORDER — LORAZEPAM 0.5 MG/1
0.5 TABLET ORAL NIGHTLY
Status: DISCONTINUED | OUTPATIENT
Start: 2022-06-23 | End: 2022-06-24 | Stop reason: HOSPADM

## 2022-06-23 RX ORDER — ASPIRIN 81 MG/1
324 TABLET, CHEWABLE ORAL ONCE
Status: COMPLETED | OUTPATIENT
Start: 2022-06-23 | End: 2022-06-23

## 2022-06-23 RX ORDER — SODIUM CHLORIDE 0.9 % (FLUSH) 0.9 %
10 SYRINGE (ML) INJECTION PRN
Status: DISCONTINUED | OUTPATIENT
Start: 2022-06-23 | End: 2022-06-24 | Stop reason: HOSPADM

## 2022-06-23 RX ADMIN — SODIUM CHLORIDE, PRESERVATIVE FREE 10 ML: 5 INJECTION INTRAVENOUS at 23:35

## 2022-06-23 RX ADMIN — SUCRALFATE 1 G: 1 TABLET ORAL at 23:31

## 2022-06-23 RX ADMIN — HYDRALAZINE HYDROCHLORIDE 10 MG: 20 INJECTION INTRAMUSCULAR; INTRAVENOUS at 13:24

## 2022-06-23 RX ADMIN — ASPIRIN 81 MG CHEWABLE TABLET 324 MG: 81 TABLET CHEWABLE at 09:45

## 2022-06-23 RX ADMIN — LORAZEPAM 0.5 MG: 0.5 TABLET ORAL at 23:30

## 2022-06-23 RX ADMIN — ACETAMINOPHEN 650 MG: 325 TABLET, FILM COATED ORAL at 23:30

## 2022-06-23 NOTE — ED NOTES
Department of Emergency Medicine  FIRST PROVIDER TRIAGE NOTE             Independent MLP           6/23/22  9:34 AM EDT    Date of Encounter: 6/23/22   MRN: [id number]      HPI: Katerine Lee is a 67 y.o. female who presents to the ED for Chest Pain (Midsternal, nonradiating chest pain that started this am at 630. )     Patient is a 76-year-old presenting with centralized chest pain. Patient describes it as a squeezing sensation that comes and goes. Patient stated that started at 630 this morning. Patient does see a cardiologist and is on diltiazem. Patient has no history of any falls. Patient states that she feels nauseous and has been having some diarrhea. ROS: Negative for sob, abd pain, back pain, fever or cough. PE: Gen Appearance/Constitutional: alert  HEENT: NC/NT. PERRLA,  Airway patent. Neck: supple     Initial Plan of Care: All treatment areas with department are currently occupied. Plan to order/Initiate the following while awaiting opening in ED: labs, EKG and imaging studies.   Initiate Treatment-Testing, Proceed toTreatment Area When Bed Available for ED Attending/MLP to Continue Care    Electronically signed by Elliott Robledo PA-C   DD: 6/23/22         Elliott Robledo PA-C  06/23/22 [id number]

## 2022-06-23 NOTE — ED PROVIDER NOTES
HPI:  6/23/22, Time: 3:20 PM EDT         Efrem Gentile is a 67 y.o. female history of hypertension, hyperlipidemia, chronic PVCs, CAD on Diltiazem 300mgs and atenolol 25mg presenting to the ED for chest pain. Pain started this morning at around 6:30 am, it is intermittent, mid sternal,  non radiating and lasts around 5-10 minutes each time and resolves by itself. Has had cardiac cath in the past for similar symptoms. Blood pressure is usually controlled at around 140/80. Today her BP has not been controlled despite being compliant with her medications. Denies nausea, shortness of breath, fevers, chills. Review of Systems:   A complete review of systems was performed and pertinent positives and negatives are stated within HPI, all other systems reviewed and are negative.          --------------------------------------------- PAST HISTORY ---------------------------------------------  Past Medical History:  has a past medical history of Anxiety, CAD (coronary artery disease), Chronic back pain, Fatigue, Heart palpitations, Hyperlipidemia, Hypertension, Hypothyroidism, and Irritable bowel syndrome. Past Surgical History:  has a past surgical history that includes Hysterectomy; Colonoscopy; Cardiac catheterization (05/14/2004); Ovary removal; Cholecystectomy; Salpingo-oophorectomy; Tonsillectomy; and pr open rx distal radius fx, extra-articular (Left, 5/30/2018). Social History:  reports that she has never smoked. She has never used smokeless tobacco. She reports current alcohol use of about 2.0 standard drinks of alcohol per week. She reports that she does not use drugs. Family History: family history includes Birth Defects in her mother; Heart Disease (age of onset: 66) in her father; Heart Disease (age of onset: 80) in her mother. The patients home medications have been reviewed.     Allergies: Ace inhibitors, Lipitor [atorvastatin], Lopid [gemfibrozil], Niacin and related, Tricor Total Bilirubin 0.3 0.0 - 1.2 mg/dL    Alkaline Phosphatase 94 35 - 104 U/L    ALT 34 (H) 0 - 32 U/L    AST 31 0 - 31 U/L   Troponin   Result Value Ref Range    Troponin, High Sensitivity <6 0 - 9 ng/L   Protime-INR   Result Value Ref Range    Protime 10.5 9.3 - 12.4 sec    INR 0.9    Brain Natriuretic Peptide   Result Value Ref Range    Pro-BNP 89 0 - 125 pg/mL   Magnesium   Result Value Ref Range    Magnesium 1.7 1.6 - 2.6 mg/dL   TSH   Result Value Ref Range    TSH 4.770 (H) 0.270 - 4.200 uIU/mL   Troponin   Result Value Ref Range    Troponin, High Sensitivity <6 0 - 9 ng/L   Troponin   Result Value Ref Range    Troponin, High Sensitivity 7 0 - 9 ng/L   Troponin   Result Value Ref Range    Troponin, High Sensitivity 9 0 - 9 ng/L   Basic Metabolic Panel w/ Reflex to MG   Result Value Ref Range    Sodium 137 132 - 146 mmol/L    Potassium reflex Magnesium 3.2 (L) 3.5 - 5.0 mmol/L    Chloride 99 98 - 107 mmol/L    CO2 22 22 - 29 mmol/L    Anion Gap 16 7 - 16 mmol/L    Glucose 96 74 - 99 mg/dL    BUN 13 6 - 23 mg/dL    CREATININE 0.7 0.5 - 1.0 mg/dL    GFR Non-African American >60 >=60 mL/min/1.73    GFR African American >60     Calcium 8.9 8.6 - 10.2 mg/dL   Hemoglobin A1c   Result Value Ref Range    Hemoglobin A1C 5.5 4.0 - 5.6 %   CBC   Result Value Ref Range    WBC 7.4 4.5 - 11.5 E9/L    RBC 4.32 3.50 - 5.50 E12/L    Hemoglobin 13.5 11.5 - 15.5 g/dL    Hematocrit 40.9 34.0 - 48.0 %    MCV 94.7 80.0 - 99.9 fL    MCH 31.3 26.0 - 35.0 pg    MCHC 33.0 32.0 - 34.5 %    RDW 13.3 11.5 - 15.0 fL    Platelets 704 168 - 906 E9/L    MPV 9.9 7.0 - 12.0 fL   Lipid panel - fasting   Result Value Ref Range    Cholesterol, Total 245 (H) 0 - 199 mg/dL    Triglycerides 276 (H) 0 - 149 mg/dL    HDL 51 >40 mg/dL    LDL Calculated 139 (H) 0 - 99 mg/dL    VLDL Cholesterol Calculated 55 mg/dL   Magnesium   Result Value Ref Range    Magnesium 1.8 1.6 - 2.6 mg/dL   POCT Glucose   Result Value Ref Range    Meter Glucose 97 74 - 99 mg/dL EKG 12 Lead   Result Value Ref Range    Ventricular Rate 62 BPM    Atrial Rate 62 BPM    P-R Interval 172 ms    QRS Duration 98 ms    Q-T Interval 436 ms    QTc Calculation (Bazett) 442 ms    P Axis 28 degrees    R Axis -4 degrees    T Axis -3 degrees   EKG 12 lead   Result Value Ref Range    Ventricular Rate 64 BPM    Atrial Rate 64 BPM    P-R Interval 154 ms    QRS Duration 86 ms    Q-T Interval 444 ms    QTc Calculation (Bazett) 458 ms    P Axis 40 degrees    R Axis 75 degrees    T Axis 66 degrees       RADIOLOGY:  Interpreted by Radiologist.  NM Cardiac Stress Test Nuclear Imaging   Final Result      The myocardial perfusion imaging was normal      Overall left ventricular systolic function was normal without regional   wall motion abnormalities. Low risk study. CT HEAD WO CONTRAST   Final Result   1. There is no acute intracranial abnormality. Specifically, there is no   intracranial hemorrhage. 2. Atrophy and periventricular leukomalacia,   3. Old lacunar infarct within the right basal ganglia   . XR CHEST (2 VW)   Final Result   No acute process. Elevated right hemidiaphragm. ------------------------- NURSING NOTES AND VITALS REVIEWED ---------------------------   The nursing notes within the ED encounter and vital signs as below have been reviewed. BP (!) 143/67   Pulse 77   Temp 98.8 °F (37.1 °C) (Oral)   Resp 18   Ht 4' 11\" (1.499 m)   Wt 114 lb (51.7 kg)   SpO2 97%   BMI 23.03 kg/m²   Oxygen Saturation Interpretation: Normal      ---------------------------------------------------PHYSICAL EXAM--------------------------------------      Constitutional/General: Alert and oriented x3, well appearing, non toxic in NAD  Head: Normocephalic and atraumatic  Eyes: PERRL, EOMI  Mouth: Oropharynx clear, handling secretions, no trismus  Neck: Supple, full ROM,   Pulmonary: Lungs clear to auscultation bilaterally, no wheezes, rales, or rhonchi.  Not in respiratory distress  Cardiovascular:  Regular rate and rhythm, no murmurs, gallops, or rubs. 2+ distal pulses  Abdomen: Soft, non tender, non distended,   Extremities: Moves all extremities x 4. Warm and well perfused  Skin: warm and dry without rash  Neurologic: GCS 15,  Psych: Normal Affect      ------------------------------ ED COURSE/MEDICAL DECISION MAKING----------------------  Medications   aspirin chewable tablet 324 mg (324 mg Oral Given 6/23/22 0945)   technetium sestamibi (CARDIOLITE) injection 73.5 millicurie (41.6 millicuries IntraVENous Given 6/24/22 0942)   technetium sestamibi (CARDIOLITE) injection 35 millicurie (36 millicuries IntraVENous Given 6/24/22 1106)   regadenoson (LEXISCAN) injection 0.4 mg (0.4 mg IntraVENous Given 6/24/22 1105)         ED COURSE:  ED Course as of 06/25/22 2235   Thu Jun 23, 2022   1000 BP(!): 230/102 [GM]   1256 TSH(!): 4.770 [GM]   1303 Troponin, High Sensitivity: <6 [GM]   1428 TSH(!): 4.770 [GM]   1508 BP(!): 230/102 [GM]   1538 Eosinophils %: 5.8 [GM]   1539 CO2: 26 [GM]      ED Course User Index  [GM] Mainor Nunez MD       Medical Decision Making:    Patient presenting with chest pain and uncontrolled hypertension. Cbc within normal limits, cmp fairly unremarkable, Pro Bnp at 89, Troponin <6 , Covid negative. EKG normal sinus rhythm with T wave abnormality also noted on pasts EKGs. No ST changes. CT head wo contrast with atrophy and periventricular leukomalacia and old lacunar infarct in right basal ganglia but without acute intracranial abnormality. Chest xray without acute process. Patient given hydralazine 10mg, bp downt to 165/95. Still  presenting intermittent chest pain every 10-15 minutes. Case discussed with hospitalist, patient will be admitted for further evaluation and management of chest pain and hypertension. 165/95 Abnormal  177/78 Abnormal  193/78 Abnormal  230/102 Abnormal          Counseling:    The emergency provider has spoken with the patient and discussed todays results, in addition to providing specific details for the plan of care and counseling regarding the diagnosis and prognosis. Questions are answered at this time and they are agreeable with the plan.      --------------------------------- IMPRESSION AND DISPOSITION ---------------------------------    IMPRESSION  1. Chest pain, unspecified type        DISPOSITION  Disposition: Admit to telemtry  Patient condition is stable      NOTE: This report was transcribed using voice recognition software.  Every effort was made to ensure accuracy; however, inadvertent computerized transcription errors may be present       Tigist Bell MD  Resident  06/25/22 [id number]

## 2022-06-23 NOTE — Clinical Note
Discharge Plan[de-identified] Other/[facility])   Telemetry/Cardiac Monitoring Required?: Yes

## 2022-06-23 NOTE — ED NOTES
Attempted report, asked to call back in 10 mins.       Patience Mackenzie, DILEEP  06/23/22 Effie Luna

## 2022-06-24 ENCOUNTER — APPOINTMENT (OUTPATIENT)
Dept: NON INVASIVE DIAGNOSTICS | Age: 72
End: 2022-06-24
Payer: MEDICARE

## 2022-06-24 ENCOUNTER — APPOINTMENT (OUTPATIENT)
Dept: NUCLEAR MEDICINE | Age: 72
End: 2022-06-24
Payer: MEDICARE

## 2022-06-24 VITALS
TEMPERATURE: 98.8 F | OXYGEN SATURATION: 97 % | HEIGHT: 59 IN | SYSTOLIC BLOOD PRESSURE: 143 MMHG | BODY MASS INDEX: 22.98 KG/M2 | HEART RATE: 77 BPM | RESPIRATION RATE: 18 BRPM | WEIGHT: 114 LBS | DIASTOLIC BLOOD PRESSURE: 67 MMHG

## 2022-06-24 LAB
ANION GAP SERPL CALCULATED.3IONS-SCNC: 16 MMOL/L (ref 7–16)
BUN BLDV-MCNC: 13 MG/DL (ref 6–23)
CALCIUM SERPL-MCNC: 8.9 MG/DL (ref 8.6–10.2)
CHLORIDE BLD-SCNC: 99 MMOL/L (ref 98–107)
CHOLESTEROL, TOTAL: 245 MG/DL (ref 0–199)
CO2: 22 MMOL/L (ref 22–29)
CREAT SERPL-MCNC: 0.7 MG/DL (ref 0.5–1)
EKG ATRIAL RATE: 64 BPM
EKG P AXIS: 40 DEGREES
EKG P-R INTERVAL: 154 MS
EKG Q-T INTERVAL: 444 MS
EKG QRS DURATION: 86 MS
EKG QTC CALCULATION (BAZETT): 458 MS
EKG R AXIS: 75 DEGREES
EKG T AXIS: 66 DEGREES
EKG VENTRICULAR RATE: 64 BPM
GFR AFRICAN AMERICAN: >60
GFR NON-AFRICAN AMERICAN: >60 ML/MIN/1.73
GLUCOSE BLD-MCNC: 96 MG/DL (ref 74–99)
HBA1C MFR BLD: 5.5 % (ref 4–5.6)
HCT VFR BLD CALC: 40.9 % (ref 34–48)
HDLC SERPL-MCNC: 51 MG/DL
HEMOGLOBIN: 13.5 G/DL (ref 11.5–15.5)
LDL CHOLESTEROL CALCULATED: 139 MG/DL (ref 0–99)
LV EF: 85 %
LVEF MODALITY: NORMAL
MAGNESIUM: 1.8 MG/DL (ref 1.6–2.6)
MCH RBC QN AUTO: 31.3 PG (ref 26–35)
MCHC RBC AUTO-ENTMCNC: 33 % (ref 32–34.5)
MCV RBC AUTO: 94.7 FL (ref 80–99.9)
METER GLUCOSE: 97 MG/DL (ref 74–99)
PDW BLD-RTO: 13.3 FL (ref 11.5–15)
PLATELET # BLD: 209 E9/L (ref 130–450)
PMV BLD AUTO: 9.9 FL (ref 7–12)
POTASSIUM REFLEX MAGNESIUM: 3.2 MMOL/L (ref 3.5–5)
RBC # BLD: 4.32 E12/L (ref 3.5–5.5)
SODIUM BLD-SCNC: 137 MMOL/L (ref 132–146)
TRIGL SERPL-MCNC: 276 MG/DL (ref 0–149)
TROPONIN, HIGH SENSITIVITY: 7 NG/L (ref 0–9)
TROPONIN, HIGH SENSITIVITY: 9 NG/L (ref 0–9)
VLDLC SERPL CALC-MCNC: 55 MG/DL
WBC # BLD: 7.4 E9/L (ref 4.5–11.5)

## 2022-06-24 PROCEDURE — 84484 ASSAY OF TROPONIN QUANT: CPT

## 2022-06-24 PROCEDURE — 6370000000 HC RX 637 (ALT 250 FOR IP): Performed by: FAMILY MEDICINE

## 2022-06-24 PROCEDURE — A9500 TC99M SESTAMIBI: HCPCS | Performed by: RADIOLOGY

## 2022-06-24 PROCEDURE — 93017 CV STRESS TEST TRACING ONLY: CPT

## 2022-06-24 PROCEDURE — 6360000002 HC RX W HCPCS: Performed by: FAMILY MEDICINE

## 2022-06-24 PROCEDURE — 2580000003 HC RX 258: Performed by: FAMILY MEDICINE

## 2022-06-24 PROCEDURE — 82962 GLUCOSE BLOOD TEST: CPT

## 2022-06-24 PROCEDURE — G0378 HOSPITAL OBSERVATION PER HR: HCPCS

## 2022-06-24 PROCEDURE — 93016 CV STRESS TEST SUPVJ ONLY: CPT | Performed by: INTERNAL MEDICINE

## 2022-06-24 PROCEDURE — 96372 THER/PROPH/DIAG INJ SC/IM: CPT

## 2022-06-24 PROCEDURE — 80048 BASIC METABOLIC PNL TOTAL CA: CPT

## 2022-06-24 PROCEDURE — 78452 HT MUSCLE IMAGE SPECT MULT: CPT

## 2022-06-24 PROCEDURE — 85027 COMPLETE CBC AUTOMATED: CPT

## 2022-06-24 PROCEDURE — 78452 HT MUSCLE IMAGE SPECT MULT: CPT | Performed by: INTERNAL MEDICINE

## 2022-06-24 PROCEDURE — 83036 HEMOGLOBIN GLYCOSYLATED A1C: CPT

## 2022-06-24 PROCEDURE — 93018 CV STRESS TEST I&R ONLY: CPT | Performed by: INTERNAL MEDICINE

## 2022-06-24 PROCEDURE — 36415 COLL VENOUS BLD VENIPUNCTURE: CPT

## 2022-06-24 PROCEDURE — 3430000000 HC RX DIAGNOSTIC RADIOPHARMACEUTICAL: Performed by: RADIOLOGY

## 2022-06-24 PROCEDURE — 83735 ASSAY OF MAGNESIUM: CPT

## 2022-06-24 PROCEDURE — 80061 LIPID PANEL: CPT

## 2022-06-24 PROCEDURE — 93005 ELECTROCARDIOGRAM TRACING: CPT | Performed by: FAMILY MEDICINE

## 2022-06-24 RX ORDER — PRAVASTATIN SODIUM 40 MG
60 TABLET ORAL DAILY
Qty: 30 TABLET | Refills: 3 | Status: SHIPPED | OUTPATIENT
Start: 2022-06-24

## 2022-06-24 RX ORDER — DILTIAZEM HYDROCHLORIDE 240 MG/1
240 CAPSULE, COATED, EXTENDED RELEASE ORAL NIGHTLY
Status: ON HOLD | COMMUNITY
End: 2022-06-24 | Stop reason: HOSPADM

## 2022-06-24 RX ORDER — DILTIAZEM HYDROCHLORIDE 300 MG/1
300 CAPSULE, COATED, EXTENDED RELEASE ORAL DAILY
Qty: 30 CAPSULE | Refills: 3 | Status: SHIPPED | OUTPATIENT
Start: 2022-06-24

## 2022-06-24 RX ADMIN — ENOXAPARIN SODIUM 40 MG: 100 INJECTION SUBCUTANEOUS at 08:40

## 2022-06-24 RX ADMIN — ACETAMINOPHEN 650 MG: 325 TABLET, FILM COATED ORAL at 13:56

## 2022-06-24 RX ADMIN — Medication 36 MILLICURIE: at 11:06

## 2022-06-24 RX ADMIN — SODIUM CHLORIDE, PRESERVATIVE FREE 10 ML: 5 INJECTION INTRAVENOUS at 08:41

## 2022-06-24 RX ADMIN — SUCRALFATE 1 G: 1 TABLET ORAL at 13:21

## 2022-06-24 RX ADMIN — PANTOPRAZOLE SODIUM 40 MG: 40 TABLET, DELAYED RELEASE ORAL at 06:55

## 2022-06-24 RX ADMIN — Medication 13.6 MILLICURIE: at 09:42

## 2022-06-24 RX ADMIN — LEVOTHYROXINE SODIUM 25 MCG: 0.05 TABLET ORAL at 06:55

## 2022-06-24 RX ADMIN — REGADENOSON 0.4 MG: 0.08 INJECTION, SOLUTION INTRAVENOUS at 11:05

## 2022-06-24 RX ADMIN — ATENOLOL 25 MG: 25 TABLET ORAL at 08:41

## 2022-06-24 RX ADMIN — SUCRALFATE 1 G: 1 TABLET ORAL at 08:40

## 2022-06-24 RX ADMIN — ASPIRIN 81 MG CHEWABLE TABLET 81 MG: 81 TABLET CHEWABLE at 08:40

## 2022-06-24 RX ADMIN — PRAVASTATIN SODIUM 40 MG: 20 TABLET ORAL at 08:40

## 2022-06-24 NOTE — PROGRESS NOTES
Patient is 67years old female admitted with complain of shortness  Of breath. She is A&O x4, up independently. Skin is intact. Patient was oriented to her room and surroundings. Safety measures are in place.

## 2022-06-24 NOTE — PROCEDURES
Lexiscan Stress EKG Report:    Dx CP    Baseline EKG: normal sinus rhythm, nonspecific ST and T waves changes. Stress EKG: No ST-T changes. Arrhythmias: None. Symptoms: None. Summary:  Unremarkable lexiscan stress EKG. See separate report for stress perfusion results. Ramin Diego D.O.   Cardiologist  Cardiology, [facility]

## 2022-06-24 NOTE — H&P
[facility]  History and Physical      CHIEF COMPLAINT:    Chief Complaint   Patient presents with    Chest Pain     Midsternal, nonradiating chest pain that started this am at 630. Patient of Claudia Armenta MD presents with:  Chest pain    History of Present Illness:   Patient is a 57-year-old female with a past medical history of CAD, hyperlipidemia, hypertension, hypothyroidism, and IBS. Patient presented to the ED with complaints of chest pain. Patient's chest pain began 6/23 approximately 6:30 AM, midsternal, nonradiating. Patient does have a history of a cardiac catheterization. Patient states the chest pressure is still there however it is not as intense. She does not associated with shortness of breath. Patient admits she has been compliant with all medications and there are no aggravating factors or relieving factors. Patient is alert and oriented on examination. Patient denies any nausea, vomiting, fever, chills, headache, dizziness, blurred vision, and abdominal pain. ER work-up revealed negative troponins, TSH 4.770, patient is admitted observation to telemetry unit for further testing and treatment. REVIEW OF SYSTEMS:  Pertinent negatives are above in HPI. 10 point ROS otherwise negative.       Past Medical History:   Diagnosis Date    Anxiety     CAD (coronary artery disease)     Chronic back pain     Fatigue     Heart palpitations     Hyperlipidemia     Hypertension     Hypothyroidism     Irritable bowel syndrome          Past Surgical History:   Procedure Laterality Date    CARDIAC CATHETERIZATION  05/14/2004    CHOLECYSTECTOMY      COLONOSCOPY      HYSTERECTOMY      OVARY REMOVAL      MI OPEN RX DISTAL RADIUS FX, EXTRA-ARTICULAR Left 5/30/2018    LEFT DISTAL RADIUS OPEN REDUCTION INTERNAL FIXATION   ++BIOMET++ performed by Scottie Palafox MD at [facility] OR    SALPINGO-OOPHORECTOMY      TONSILLECTOMY         Medications Prior to Admission:    Medications Prior to Admission: diltiazem (CARDIZEM CD) 300 MG extended release capsule,   hydrocortisone (ANUSOL-HC) 2.5 % rectal cream,   estradiol (CLIMARA) 0.05 MG/24HR,   sucralfate (CARAFATE) 1 GM tablet, Take 1 g by mouth 4 times daily  ibuprofen (ADVIL;MOTRIN) 400 MG tablet, Take 400 mg by mouth every 6 hours as needed for Pain  dicyclomine (BENTYL) 10 MG capsule, Take 10 mg by mouth as needed   atenolol (TENORMIN) 25 MG tablet, Take 25 mg by mouth daily  DiltiaZEM HCl ER Coated Beads (CARDIZEM LA) 300 MG TB24, Take by mouth nightly   clorazepate (TRANXENE) 3.75 MG tablet, Take 3.75 mg by mouth nightly  levothyroxine (SYNTHROID) 25 MCG tablet, Take 25 mcg by mouth Daily  omeprazole (PRILOSEC) 20 MG capsule, Take 40 mg by mouth daily   pravastatin (PRAVACHOL) 40 MG tablet, Take 40 mg by mouth daily  Cholecalciferol (VITAMIN D3) 2000 UNITS CAPS, Take by mouth daily     Note that the patient's home medications were reviewed and the above list is accurate to the best of my knowledge at the time of the exam.    Allergies:    Ace inhibitors, Lipitor [atorvastatin], Lopid [gemfibrozil], Niacin and related, Tricor [fenofibrate], Vytorin [ezetimibe-simvastatin], Welchol [colesevelam hcl], Zetia [ezetimibe], Zocor [simvastatin], Codeine, Crestor [rosuvastatin calcium], Flaxseed (linseed), Percocet [oxycodone-acetaminophen], and Vicodin [hydrocodone-acetaminophen]    Social History:    reports that she has never smoked. She has never used smokeless tobacco. She reports current alcohol use of about 2.0 standard drinks of alcohol per week. She reports that she does not use drugs. Family History:   family history includes Birth Defects in her mother; Heart Disease (age of onset: 66) in her father; Heart Disease (age of onset: 80) in her mother.       PHYSICAL EXAM:    Vitals:  /86   Pulse 71   Temp 98.1 °F (36.7 °C) (Oral)   Resp 16   Ht 4' 11\" (1.499 m)   Wt 114 lb (51.7 kg)   SpO2 97%   BMI 23.03 kg/m²       General appearance: NAD, conversant, pleasant  Eyes: Sclerae anicteric, PERRLA  HEENT: AT/NC, MMM  Neck: FROM, supple, no thyromegaly  Lymph: No cervical / supraclavicular lymphadenopathy  Lungs: Clear to auscultation, WOB normal  CV: RRR, no MRGs, no lower extremity edema  Abdomen: Soft, non-tender; no masses or HSM, +BS  Extremities: FROM without synovitis. No clubbing or cyanosis of the hands. Skin: no rash, induration, lesions, or ulcers  Psych: Calm and cooperative. Normal judgement and insight. Normal mood and affect. Neuro: Alert and interactive, face symmetric, speech fluent. LABS:  All labs reviewed. Of note:  CBC with Differential:    Lab Results   Component Value Date    WBC 6.1 06/23/2022    RBC 4.63 06/23/2022    HGB 14.5 06/23/2022    HCT 43.9 06/23/2022     06/23/2022    MCV 94.8 06/23/2022    MCH 31.3 06/23/2022    MCHC 33.0 06/23/2022    RDW 13.3 06/23/2022    LYMPHOPCT 36.0 06/23/2022    MONOPCT 8.3 06/23/2022    BASOPCT 1.3 06/23/2022    MONOSABS 0.50 06/23/2022    LYMPHSABS 2.18 06/23/2022    EOSABS 0.35 06/23/2022    BASOSABS 0.08 06/23/2022     CMP:    Lab Results   Component Value Date     06/23/2022    K 4.1 06/23/2022    K 4.0 05/30/2018     06/23/2022    CO2 26 06/23/2022    BUN 13 06/23/2022    CREATININE 0.8 06/23/2022    GFRAA >60 06/23/2022    LABGLOM >60 06/23/2022    GLUCOSE 108 06/23/2022    PROT 7.9 06/23/2022    LABALBU 4.6 06/23/2022    CALCIUM 9.7 06/23/2022    BILITOT 0.3 06/23/2022    ALKPHOS 94 06/23/2022    AST 31 06/23/2022    ALT 34 06/23/2022       Imaging:  CXR: No acute process. Elevated right hemidiaphragm. CT head: There is no acute intracranial abnormality. Atrophy and periventricular leukomalacia. Old lacunar infarct within the right basal ganglia. EKG:  NSR    Telemetry:  I've personally reviewed the patient's telemetry:      ASSESSMENT/PLAN:  Principal Problem:    Chest pain  Resolved Problems:    * No resolved hospital problems. *    53-year-old female with a history of CAD and hypertension presents to the ED with complaints of chest pain and is admitted to telemetry unit observation with    Chest pain  -Monitor troponins- negative x3  -Stress test n.p.o. after midnight  -Follow labs  -Monitor blood pressure adjust medications as needed    Medication for other comorbidities continue as appropriate dose adjustment as necessary. DVT prophylaxis  PT OT  Discharge planning  Case discussed with attending and agreed upon plan of care. Code status: Full  Requires inpatient level of care  MARGARET Dsouza CNP    5:16 AM  6/24/2022    Above note edited to reflect my thoughts     I personally saw, examined and provided care for the patient. Radiographs, labs and medication list were reviewed by me independently. The case was discussed in detail and plans for care were established. Review of Oralia WALLS CNP, documentation was conducted and revisions were made as appropriate directly by me. I agree with the above documented exam, problem list, and plan of care.      Melba Melara MD  6/24/22

## 2022-06-24 NOTE — PLAN OF CARE
Problem: Discharge Planning  Goal: Discharge to home or other facility with appropriate resources  6/24/2022 1614 by Nicolas Orona RN  Outcome: Adequate for Discharge  6/24/2022 0341 by Ling Aleman RN  Outcome: Progressing  6/24/2022 0340 by Ling Aleman RN  Outcome: Progressing  Flowsheets (Taken 6/23/2022 2200)  Discharge to home or other facility with appropriate resources: Identify barriers to discharge with patient and caregiver

## 2022-06-24 NOTE — CARE COORDINATION
Care Coordination + observation   Patient came in as observation for mid sternal chest pain that started yesterday am. The patient has a significant pmh CAD, hyperlipidemia, hypertension, hypothyroidism. Her troponons x3 where negative. Npo after midnight. Per the patient she lives in a 1 story home with 2 steps to enter. No dme at home as she was independent prior to admission. Her Primary care Physician is Sindy Carbtree MD and her pharmacy is [facility]. Her plan is to return home if her stress test is negative.  Will follow

## 2024-02-26 NOTE — PROGRESS NOTES
History Of Present Illness  Viky Smith is a 73 y.o. female referred by Dr. Yoav Mercado for evaluation of colon polyp.    Patient reports that she underwent a colonoscopy with Dr. Mercado December 2023 as she was due.  Prior scopes were 2010, two small 2-5 mm  tubulovillous adenoma ascending colon polyps and 2015, normal.  December 2023, She was found to have a  mid ascending colon was what appeared to be a rather large polyp.  It was at least two cm plus in size. But I could not fully identify the distal margin.  Dr. Mercado did not attempt to remove it or biopsy it.    Patient reports has not having any change of bowel habit, rectal bleeding, abdominal pain, or weight loss.  Bowel habit is 3-4 times per day.   Stool consistency is mostly watery.  Hardly ever has a solid stool.    11/15/2010 Colonoscopy to cecum  Digital rectal exam was normal.  There was redundancy and angulation of the sigmoid colon and the lumen could just not be visualized and no attempt at blind passage.  Only an occasional diverticulum was noted.  Position changes and withdrawal and reinsertion did not allow me to navigate through the sigmoid colon.  With the patient in left lateral, an Olympus GIF-180 video scope was guided into the rectum and used as a colonoscope.  It was a little bit easier but it still took time to visualize the lumen and allow navigation to the cecum.  Two small polyps were located in the ascending colon.  One was about 2 mm in sizs and the other was about 5 mm in size or less. Both were removed with a cold snare.  There were no inflammatory changes. The instrument was retrieved decompressing the colon and examination was unremarkable otherwise.  I did take random mucosal biopsies to rule out lymphocytic or collagenous colitis.  Pathology:    A. Ascending colon, polypectomy: mixed tubulovillous adenoma fragments  B.  Colon, random biopsy: Colonic mucosa, no significant pathologic changes.    12/15/2015 Colonoscopy to  cecum:    Normal, No metachronous lesions    12/18/2023 Colonoscopy to cecum  Colonoscope was introduced into the rectum.  The rectum was normal.  The instrument was passed proximally to a depth of 30 cm.  It was sharply angulated.  Some diverticular disease was present.  The lumen was not entirely visualized.  Withdrawn, reinserted.  I could not safely navigate this area.  The endoscope was withdrawn.  The Olympus [id number] vidoe endoscope was inserted in it's place.  It was guided proximally. The lumen was identified and ultimately the area was traversed. Cecal intubation was achieve or at least to the level of the ileocecal valve and could visualize the cecum well but actual intubation to the cap of the cecum did not occur.  On withdrawal, in the mid ascending colon was what appeared to be a rather large polyp.  It was at least two cm plus in size. But I could not fully identify the distal margin.  I could not get below it to retroflex it.  I tried to position it.  I grabbed a portion with a snare, but did not transect.  Again I did not think I would be able to remove the whole lesion as it was not appropriately oriented and I was uncertain of size.  I decided to leave it. The instrument was retrieved.  The colon was decompressed.  Diverticular disease in the left colon, but nothing more.  I attempted the pediatric colonoscope once again both supine and left lateral and again failed to navigate the sigmoid.  The procedure was terminated.       Past Medical History  CAD  Anxiety  GERD  Hypothyroidism  HLD  HTN  Endometriosis    Surgical History  Cardiac Catheterization 2005  Lx Cholecystectomy  Oophorectomy  Hysterectomy - endometriosis - pfannensteil      Social History  Smoking-Never  ETOH:  2-3 glasses wine per week    Family History  Mother: Colon cancer, diagnosed at age 59  Maternal Aunt:  Colon cancer, needed a colostomy  Maternal Aunt #2: Melanoma  Maternal Aunt, #3: Oral cancer  Maternal Aunt #4: Uterine  "cancer  Maternal Grandmother:  Breast cancer  Paternal cousin - Brain CA   Paternal cousin - 3 breast CA  Visit Vitals  BP (!) 193/89   Pulse 58   Temp 36.2 °C (97.2 °F)   Ht 1.499 m (4' 11\")   Wt 56.2 kg (124 lb)   SpO2 94%   BMI 25.04 kg/m²   Smoking Status Never   BSA 1.53 m²       Review of Systems  Constitutional: Negative for fever, chills, anorexia, weight loss, malaise     ENMT: Negative for nasal discharge, congestion, ear pain, mouth pain, throat pain     Respiratory: Negative for cough, hemoptysis, wheezing, shortness of breath     Cardiac: Negative for chest pain, dyspnea on exertion, orthopnea, palpitations, syncope     Gastrointestinal: Negative for nausea, vomiting, diarrhea, constipation, abdominal pain,     Genitourinary: Negative for discharge, dysuria, flank pain, frequency, hematuria     Musculoskeletal: Negative for decreased ROM, pain, swelling, weakness     Neurological: Negative for dizziness, confusion, headache, seizures, syncope     Psychiatric: Negative for mood changes, anxiety, hallucinations, sleep changes, suicidal ideas     Skin: Negative for mass, pain, itching, rash, ulcer     Endocrine: Negative for heat intolerance, cold intolerance, excessive sweating, polyuria, excess thirst     Hematologic/Lymph: Negative for anemia, bruising, easy bleeding, night sweats, petechiae, history of DVT/PE or cancer     Allergic/Immunologic: Negative for anaphylaxis, itchy/ teary eyes, itching, sneezing, swelling    Physical Exam  Constitutional: Well developed, awake/alert/oriented x3, no distress, alert and cooperative             Eyes: Sclera anicteric, no conjunctival inflammation, conjugate gaze    ENMT: mucous membranes moist, no apparent injury,            Head/Neck: Neck supple, no apparent injury, No JVD, trachea midline, no bruits              Respiratory/Thorax: Patent airways, CTAB, normal breath sounds with good chest expansion, thorax symmetric         Cardiovascular: Regular, rate and " rhythm, no murmurs, normal S1 and S2         Gastrointestinal: Nondistended, soft, non-tender, no rebound tenderness or guarding, no masses palpable, no organomegaly, +BS, no bruits , well healed pfannensteil              Extremities: normal extremities, no cyanosis edema, contusions or wounds, 2+ femoral pulses B/L              Neurological: alert and oriented x3, normal strength, Normal gait          Lymphatic: No palpable inguinal lymphadenopathy   Psychological: Appropriate mood and behavior         Skin: Warm and dry, no lesions, no rashes                      Provider Impression:  Pt with difficult ascending colon polyp with difficult sigmoid colon likely related to CULLEN/BSO    Plan:  Colonoscopy attempt peds scope to see if we can get there to get the polyp out.

## 2024-02-28 PROBLEM — R45.89 PREDOMINANT DISTURBANCE OF EMOTIONS: Status: ACTIVE | Noted: 2024-02-28

## 2024-02-28 PROBLEM — R10.9 ABDOMINAL PAIN: Status: ACTIVE | Noted: 2024-02-28

## 2024-02-28 PROBLEM — M54.2 CERVICALGIA: Status: ACTIVE | Noted: 2024-02-28

## 2024-03-06 ENCOUNTER — OFFICE VISIT (OUTPATIENT)
Dept: SURGERY | Facility: CLINIC | Age: 74
End: 2024-03-06
Payer: MEDICARE

## 2024-03-06 VITALS
TEMPERATURE: 97.2 F | SYSTOLIC BLOOD PRESSURE: 193 MMHG | HEART RATE: 58 BPM | BODY MASS INDEX: 25 KG/M2 | HEIGHT: 59 IN | OXYGEN SATURATION: 94 % | DIASTOLIC BLOOD PRESSURE: 89 MMHG | WEIGHT: 124 LBS

## 2024-03-06 DIAGNOSIS — D12.2 ADENOMATOUS POLYP OF ASCENDING COLON: Primary | ICD-10-CM

## 2024-03-06 PROCEDURE — 1159F MED LIST DOCD IN RCRD: CPT | Performed by: COLON & RECTAL SURGERY

## 2024-03-06 PROCEDURE — 99213 OFFICE O/P EST LOW 20 MIN: CPT | Performed by: COLON & RECTAL SURGERY

## 2024-03-06 PROCEDURE — 99203 OFFICE O/P NEW LOW 30 MIN: CPT | Performed by: COLON & RECTAL SURGERY

## 2024-03-06 PROCEDURE — 1126F AMNT PAIN NOTED NONE PRSNT: CPT | Performed by: COLON & RECTAL SURGERY

## 2024-03-06 PROCEDURE — 3077F SYST BP >= 140 MM HG: CPT | Performed by: COLON & RECTAL SURGERY

## 2024-03-06 PROCEDURE — 1036F TOBACCO NON-USER: CPT | Performed by: COLON & RECTAL SURGERY

## 2024-03-06 PROCEDURE — 3079F DIAST BP 80-89 MM HG: CPT | Performed by: COLON & RECTAL SURGERY

## 2024-03-06 RX ORDER — ESTRADIOL 0.06 MG/D
1 PATCH TRANSDERMAL
COMMUNITY
End: 2024-05-28 | Stop reason: ALTCHOICE

## 2024-03-06 RX ORDER — OMEPRAZOLE 40 MG/1
40 CAPSULE, DELAYED RELEASE ORAL
COMMUNITY

## 2024-03-06 RX ORDER — SUCRALFATE 1 G/1
1 TABLET ORAL
COMMUNITY

## 2024-03-06 RX ORDER — DILTIAZEM HYDROCHLORIDE 300 MG/1
300 CAPSULE, EXTENDED RELEASE ORAL DAILY
COMMUNITY
End: 2024-05-28 | Stop reason: WASHOUT

## 2024-03-06 RX ORDER — CLORAZEPATE DIPOTASSIUM 3.75 MG/1
3.75 TABLET ORAL 2 TIMES DAILY
COMMUNITY

## 2024-03-06 RX ORDER — ATENOLOL 25 MG/1
TABLET ORAL DAILY
COMMUNITY

## 2024-03-06 RX ORDER — LEVOTHYROXINE SODIUM 25 UG/1
CAPSULE ORAL
COMMUNITY
End: 2024-05-28 | Stop reason: WASHOUT

## 2024-03-06 ASSESSMENT — PAIN SCALES - GENERAL: PAINLEVEL: 0-NO PAIN

## 2024-03-06 ASSESSMENT — ENCOUNTER SYMPTOMS: DEPRESSION: 0

## 2024-03-07 DIAGNOSIS — K63.5 POLYP OF ASCENDING COLON, UNSPECIFIED TYPE: ICD-10-CM

## 2024-03-07 RX ORDER — SODIUM, POTASSIUM,MAG SULFATES 17.5-3.13G
SOLUTION, RECONSTITUTED, ORAL ORAL
Qty: 1 EACH | Refills: 0 | OUTPATIENT
Start: 2024-03-07 | End: 2024-05-30

## 2024-05-28 RX ORDER — PRAVASTATIN SODIUM 80 MG/1
TABLET ORAL
COMMUNITY
Start: 2023-12-25

## 2024-05-28 RX ORDER — DILTIAZEM HYDROCHLORIDE 300 MG/1
300 CAPSULE, COATED, EXTENDED RELEASE ORAL
COMMUNITY
Start: 2024-01-05

## 2024-05-28 RX ORDER — IBUPROFEN 400 MG/1
400 TABLET ORAL EVERY 6 HOURS PRN
COMMUNITY

## 2024-05-28 RX ORDER — LEVOTHYROXINE SODIUM 25 UG/1
25 TABLET ORAL DAILY
COMMUNITY

## 2024-05-28 RX ORDER — ESTRADIOL 0.03 MG/D
1 PATCH TRANSDERMAL
COMMUNITY
Start: 2023-12-30

## 2024-05-28 RX ORDER — MECLIZINE HCL 12.5 MG 12.5 MG/1
12.5 TABLET ORAL 3 TIMES DAILY PRN
COMMUNITY
Start: 2023-10-30

## 2024-05-30 ENCOUNTER — ANESTHESIA (OUTPATIENT)
Dept: GASTROENTEROLOGY | Facility: HOSPITAL | Age: 74
End: 2024-05-30
Payer: MEDICARE

## 2024-05-30 ENCOUNTER — ANESTHESIA EVENT (OUTPATIENT)
Dept: GASTROENTEROLOGY | Facility: HOSPITAL | Age: 74
End: 2024-05-30
Payer: MEDICARE

## 2024-05-30 ENCOUNTER — HOSPITAL ENCOUNTER (OUTPATIENT)
Dept: GASTROENTEROLOGY | Facility: HOSPITAL | Age: 74
Discharge: HOME | End: 2024-05-30
Payer: MEDICARE

## 2024-05-30 ENCOUNTER — APPOINTMENT (OUTPATIENT)
Dept: GASTROENTEROLOGY | Facility: HOSPITAL | Age: 74
End: 2024-05-30
Payer: MEDICARE

## 2024-05-30 VITALS
HEART RATE: 54 BPM | SYSTOLIC BLOOD PRESSURE: 159 MMHG | RESPIRATION RATE: 21 BRPM | BODY MASS INDEX: 25.22 KG/M2 | HEIGHT: 58 IN | DIASTOLIC BLOOD PRESSURE: 64 MMHG | OXYGEN SATURATION: 92 % | WEIGHT: 120.15 LBS | TEMPERATURE: 97 F

## 2024-05-30 DIAGNOSIS — K63.5 POLYP OF ASCENDING COLON, UNSPECIFIED TYPE: Primary | ICD-10-CM

## 2024-05-30 PROCEDURE — 99100 ANES PT EXTEME AGE<1 YR&>70: CPT | Performed by: ANESTHESIOLOGY

## 2024-05-30 PROCEDURE — 2500000005 HC RX 250 GENERAL PHARMACY W/O HCPCS

## 2024-05-30 PROCEDURE — 45385 COLONOSCOPY W/LESION REMOVAL: CPT | Performed by: COLON & RECTAL SURGERY

## 2024-05-30 PROCEDURE — 2720000007 HC OR 272 NO HCPCS

## 2024-05-30 PROCEDURE — 3700000001 HC GENERAL ANESTHESIA TIME - INITIAL BASE CHARGE

## 2024-05-30 PROCEDURE — 7100000010 HC PHASE TWO TIME - EACH INCREMENTAL 1 MINUTE

## 2024-05-30 PROCEDURE — 2500000004 HC RX 250 GENERAL PHARMACY W/ HCPCS (ALT 636 FOR OP/ED)

## 2024-05-30 PROCEDURE — A45385 PR COLONOSCOPY,REMV LESN,SNARE

## 2024-05-30 PROCEDURE — 0753T DGTZ GLS MCRSCP SLD LEVEL IV: CPT | Mod: TC,AHULAB | Performed by: COLON & RECTAL SURGERY

## 2024-05-30 PROCEDURE — A45385 PR COLONOSCOPY,REMV LESN,SNARE: Performed by: ANESTHESIOLOGY

## 2024-05-30 PROCEDURE — 3700000002 HC GENERAL ANESTHESIA TIME - EACH INCREMENTAL 1 MINUTE

## 2024-05-30 PROCEDURE — 7100000009 HC PHASE TWO TIME - INITIAL BASE CHARGE

## 2024-05-30 PROCEDURE — 2500000004 HC RX 250 GENERAL PHARMACY W/ HCPCS (ALT 636 FOR OP/ED): Performed by: COLON & RECTAL SURGERY

## 2024-05-30 PROCEDURE — 45390 COLONOSCOPY W/RESECTION: CPT | Performed by: COLON & RECTAL SURGERY

## 2024-05-30 RX ORDER — SODIUM CHLORIDE, SODIUM LACTATE, POTASSIUM CHLORIDE, CALCIUM CHLORIDE 600; 310; 30; 20 MG/100ML; MG/100ML; MG/100ML; MG/100ML
20 INJECTION, SOLUTION INTRAVENOUS CONTINUOUS
Status: DISCONTINUED | OUTPATIENT
Start: 2024-05-30 | End: 2024-05-31 | Stop reason: HOSPADM

## 2024-05-30 RX ORDER — LIDOCAINE HYDROCHLORIDE 20 MG/ML
INJECTION, SOLUTION EPIDURAL; INFILTRATION; INTRACAUDAL; PERINEURAL AS NEEDED
Status: DISCONTINUED | OUTPATIENT
Start: 2024-05-30 | End: 2024-05-30

## 2024-05-30 RX ORDER — FENTANYL CITRATE 50 UG/ML
INJECTION, SOLUTION INTRAMUSCULAR; INTRAVENOUS AS NEEDED
Status: DISCONTINUED | OUTPATIENT
Start: 2024-05-30 | End: 2024-05-30

## 2024-05-30 RX ORDER — PROPOFOL 10 MG/ML
INJECTION, EMULSION INTRAVENOUS CONTINUOUS PRN
Status: DISCONTINUED | OUTPATIENT
Start: 2024-05-30 | End: 2024-05-30

## 2024-05-30 RX ADMIN — PROPOFOL 40 MG: 10 INJECTION, EMULSION INTRAVENOUS at 10:34

## 2024-05-30 RX ADMIN — PROPOFOL 100 MCG/KG/MIN: 10 INJECTION, EMULSION INTRAVENOUS at 09:42

## 2024-05-30 RX ADMIN — SODIUM CHLORIDE, POTASSIUM CHLORIDE, SODIUM LACTATE AND CALCIUM CHLORIDE: 600; 310; 30; 20 INJECTION, SOLUTION INTRAVENOUS at 09:41

## 2024-05-30 RX ADMIN — PROPOFOL 30 MG: 10 INJECTION, EMULSION INTRAVENOUS at 10:45

## 2024-05-30 RX ADMIN — PROPOFOL 40 MG: 10 INJECTION, EMULSION INTRAVENOUS at 10:15

## 2024-05-30 RX ADMIN — PROPOFOL 40 MG: 10 INJECTION, EMULSION INTRAVENOUS at 09:43

## 2024-05-30 RX ADMIN — LIDOCAINE HYDROCHLORIDE 100 MG: 20 INJECTION, SOLUTION EPIDURAL; INFILTRATION; INTRACAUDAL; PERINEURAL at 09:42

## 2024-05-30 RX ADMIN — FENTANYL CITRATE 100 MCG: 50 INJECTION, SOLUTION INTRAMUSCULAR; INTRAVENOUS at 10:01

## 2024-05-30 RX ADMIN — GLYCOPYRROLATE 0.2 MG: 0.2 INJECTION, SOLUTION INTRAMUSCULAR; INTRAVITREAL at 09:56

## 2024-05-30 ASSESSMENT — PAIN SCALES - GENERAL
PAINLEVEL_OUTOF10: 0 - NO PAIN

## 2024-05-30 ASSESSMENT — ENCOUNTER SYMPTOMS
RESPIRATORY NEGATIVE: 1
CONSTITUTIONAL NEGATIVE: 1
GASTROINTESTINAL NEGATIVE: 1
CARDIOVASCULAR NEGATIVE: 1
EYES NEGATIVE: 1
NEUROLOGICAL NEGATIVE: 1
HEMATOLOGIC/LYMPHATIC NEGATIVE: 1
ENDOCRINE NEGATIVE: 1

## 2024-05-30 ASSESSMENT — COLUMBIA-SUICIDE SEVERITY RATING SCALE - C-SSRS
1. IN THE PAST MONTH, HAVE YOU WISHED YOU WERE DEAD OR WISHED YOU COULD GO TO SLEEP AND NOT WAKE UP?: NO
6. HAVE YOU EVER DONE ANYTHING, STARTED TO DO ANYTHING, OR PREPARED TO DO ANYTHING TO END YOUR LIFE?: NO
2. HAVE YOU ACTUALLY HAD ANY THOUGHTS OF KILLING YOURSELF?: NO

## 2024-05-30 ASSESSMENT — PAIN - FUNCTIONAL ASSESSMENT
PAIN_FUNCTIONAL_ASSESSMENT: 0-10

## 2024-05-30 NOTE — ANESTHESIA POSTPROCEDURE EVALUATION
Patient: Viky Smith    Procedure Summary       Date: 05/30/24 Room / Location: [facility]    Anesthesia Start: 0941 Anesthesia Stop: 1055    Procedure: COLONOSCOPY Diagnosis:       Polyp of ascending colon, unspecified type      Polyp of ascending colon, unspecified type    Scheduled Providers: Ludy Freire MD; Jaciel Simon MD; BEATRIZ Alexandra Responsible Provider: Jaciel Simon MD    Anesthesia Type: MAC ASA Status: 2            Anesthesia Type: MAC    Vitals Value Taken Time   /64 05/30/24 1129   Temp 36.1 °C (97 °F) 05/30/24 1053   Pulse 54 05/30/24 1126   Resp 21 05/30/24 1123   SpO2 89 % 05/30/24 1125   Vitals shown include unfiled device data.    Anesthesia Post Evaluation    Patient location during evaluation: PACU  Patient participation: complete - patient participated  Level of consciousness: awake and alert  Pain management: adequate  Airway patency: patent  Cardiovascular status: acceptable and hemodynamically stable  Respiratory status: acceptable, spontaneous ventilation and nonlabored ventilation  Hydration status: acceptable  Postoperative Nausea and Vomiting: none        There were no known notable events for this encounter.

## 2024-05-30 NOTE — ANESTHESIA PREPROCEDURE EVALUATION
Patient: Viky Smith    Procedure Information       Date/Time: 05/30/24 0950    Scheduled providers: Ludy Freire MD; Jaciel Simon MD; BEATRIZ Alexandra    Procedure: COLONOSCOPY    Location: [facility]            Relevant Problems   Cardiac   (+) Essential hypertension   (+) Hyperlipidemia      HEENT   (+) Sinusitis, chronic       Clinical information reviewed:   Tobacco  Allergies  Meds   Med Hx  Surg Hx   Fam Hx  Soc Hx         Past Medical History:   Diagnosis Date    Delayed emergence from general anesthesia     GERD (gastroesophageal reflux disease)     Hyperlipidemia     Hypertension     PONV (postoperative nausea and vomiting)       Past Surgical History:   Procedure Laterality Date    BILATERAL OOPHORECTOMY      CARDIAC CATHETERIZATION  2004    CATARACT EXTRACTION W/ INTRAOCULAR LENS  IMPLANT, BILATERAL Bilateral     CHOLECYSTECTOMY      COLONOSCOPY      2010,2015    ESOPHAGOGASTRODUODENOSCOPY      HYSTERECTOMY      TONSILECTOMY, ADENOIDECTOMY, BILATERAL MYRINGOTOMY AND TUBES      UTERINE FIBROID SURGERY      WRIST ARTHROPLASTY Left      Social History     Tobacco Use    Smoking status: Never    Smokeless tobacco: Never   Vaping Use    Vaping status: Never Used   Substance Use Topics    Alcohol use: Yes     Alcohol/week: 3.0 standard drinks of alcohol     Types: 3 Glasses of wine per week    Drug use: Never      Current Outpatient Medications   Medication Instructions    atenolol (Tenormin) 25 mg tablet oral, Daily    clorazepate (TRANXENE) 3.75 mg, oral, 2 times daily    dilTIAZem CD (CARDIZEM CD) 300 mg, oral, Daily before breakfast    estradiol (Climara) 0.025 mg/24 hr patch 1 patch, transdermal, Once Weekly    ibuprofen 400 mg, oral, Every 6 hours PRN    levothyroxine (SYNTHROID, LEVOXYL) 25 mcg, oral, Daily    meclizine (ANTIVERT) 12.5 mg, oral, 3 times daily PRN    omeprazole (PRILOSEC) 40 mg, oral, Do not crush or chew.    pravastatin (Pravachol) 80 mg tablet TAKE 1/2 TABLET  "(40MG) BY MOUTH EVERY OTHER DAY ALTERNATING WITH 1 TABLET (80MG) EVERY OTHER DAY    sodium,potassium,mag sulfates (Suprep) 17.5-3.13-1.6 gram recon soln solution Please refer to the printed instructions that were mailed to you.  Please follow the split dosing instructions.    sucralfate (CARAFATE) 1 g, oral, 4 times daily before meals and nightly      Allergies   Allergen Reactions    Ace Inhibitors Anaphylaxis and Palpitations     palpitations    Codeine Anaphylaxis and Nausea And Vomiting        Chemistry    No results found for: \"NA\", \"K\", \"CL\", \"CO2\", \"BUN\", \"CREATININE\", \"GLU\" No results found for: \"CALCIUM\", \"ALKPHOS\", \"AST\", \"ALT\", \"BILITOT\"       Lab Results   Component Value Date    HGBA1C 5.5 06/24/2022     No results found for: \"WBC\", \"HGB\", \"HCT\", \"PLT\"  No results found for: \"PROTIME\", \"PTT\", \"INR\"  No results found for: \"ABORH\"  No results found for this or any previous visit (from the past 4464 hour(s)).  No results found for this or any previous visit from the past 1095 days.   Nuclear stress 6/24/2022:  IMPRESSION:     The myocardial perfusion imaging was normal     Overall left ventricular systolic function was normal without regional   wall motion abnormalities.     Visit Vitals  BP (!) 186/63 Comment: md talkingwith pt  ; anesthesia inserting iv   Pulse 59   Resp 16   Ht 1.473 m (4' 10\")   Wt 54.5 kg (120 lb 2.4 oz)   SpO2 97%   BMI 25.11 kg/m²   Smoking Status Never   BSA 1.49 m²     NPO/Void Status  Date of Last Liquid: 05/30/24  Time of Last Liquid: 0600  Date of Last Solid: 05/28/24  Time of Last Solid: 2000  Last Intake Type: Clear fluids  Time of Last Void: 0918         Physical Exam    Airway  Mallampati: II  TM distance: >3 FB  Neck ROM: full     Cardiovascular   Rhythm: regular  Rate: normal     Dental - normal exam     Pulmonary   Breath sounds clear to auscultation     Abdominal - normal exam              Anesthesia Plan    History of general anesthesia?: yes  History of complications " of general anesthesia?: no    ASA 2     MAC   (With standard ASA monitoring.)  intravenous induction   Anesthetic plan and risks discussed with patient.    Plan discussed with CRNA and CAA.

## 2024-05-30 NOTE — DISCHARGE INSTRUCTIONS
Patient Instructions after a Colonoscopy      The anesthetics, sedatives or narcotics which were given to you today will be acting in your body for the next 24 hours, so you might feel a little sleepy or groggy.  This feeling should slowly wear off. Carefully read and follow the instructions.     You received sedation today:  - Do not drive or operate any machinery or power tools of any kind.   - No alcoholic beverages today, not even beer or wine.  - Do not make any important decisions or sign any legal documents.  - No over the counter medications that contain alcohol or that may cause drowsiness.  - Do not make any important decisions or sign any legal documents.    While it is common to experience mild to moderate abdominal distention, gas, or belching after your procedure, if any of these symptoms occur following discharge from the GI Lab or within one week of having your procedure, call the [facility] to be advised whether a visit to your nearest Urgent Care or Emergency Department is indicated.  Take this paper with you if you go.     - If you develop an allergic reaction to the medications that were given during your procedure such as difficulty breathing, rash, hives, severe nausea, vomiting or lightheadedness.  - If you experience chest pain, shortness of breath, severe abdominal pain, fevers and chills.  -If you develop signs and symptoms of bleeding such as blood in your spit, if your stools turn black, tarry, or bloody  - If you have not urinated within 8 hours following your procedure.  - If your IV site becomes painful, red, inflamed, or looks infected.    If you received a biopsy/polypectomy/sphincterotomy the following instructions apply below:    __ Do not use Aspirin containing products, non-steroidal medications or anti-coagulants for one week following your procedure. (Examples of these types of medications are: Advil, Arthrotec, Aleve, Coumadin, Ecotrin, Heparin, Ibuprofen,  Indocin, Motrin, Naprosyn, Nuprin, Plavix, Vioxx, and Voltarin, or their generic forms.  This list is not all-inclusive.  Check with your physician or pharmacist before resuming medications.)   __ Eat a soft diet today.  Avoid foods that are poorly digested for the next 24 hours.  These foods would include: nuts, beans, lettuce, red meats, and fried foods. Start with liquids and advance your diet as tolerated, gradually work up to eating solids.   __ Do not have a Barium Study or Enema for one week.    Your physician recommends the additional following instructions:    -You have a contact number available for emergencies. The signs and symptoms of potential delayed complications were discussed with you. You may return to normal activities tomorrow.  -Resume your previous diet.  -Continue your present medications.   -We are waiting for your pathology results.  -Your physician has recommended a repeat colonoscopy (date to be determined after pending pathology results are reviewed) for surveillance based on pathology results.  -The findings and recommendations have been discussed with you.  -The findings and recommendations were discussed with your family.  - Please see Medication Reconciliation Form for new medication/medications prescribed.       If you experience any problems or have any questions following discharge from the GI Lab, please call:        Nurse Signature                                                                        Date___________________                                                                            Patient/Responsible Party Signature                                        Date___________________

## 2024-05-30 NOTE — LETTER
Ludy Freire MD   [facility]   [address] [address]   [phone]        Dear MsShruthi Sarah,       It was my pleasure to see you again at your recent colonoscopy.  At that time,  you were noted to have 3 polyps in the ascending colon.  The polyps were completely excised and pathology returned the adenomatous type.  Which are a precancerous type of polyp if not removed, but yours were completely resected.  The current recommendation is to repeat the colonoscopy in 6 months to confirm no regrowth at the large polypectomy site.      Thank you very much for allowing me to take part in your care, please feel free to contact me with any questions or concerns at [phone].          Sincerely,       Ludy Freire M.D. FACS, FASCMARI    CC:  Primary Care:

## 2024-05-30 NOTE — H&P
History Of Present Illness  Viky Smith is a 74 y.o. female with GERD and hypertension presenting with large colon polyp. She saw Dr. Freire in the office on March 6, 2024 at which time she felt that there was an ascending colon polyp that was difficult to remove due to a difficult sigmoid colon in the setting of CULLEN/BSO.     Past Medical History  Past Medical History:   Diagnosis Date    Delayed emergence from general anesthesia     GERD (gastroesophageal reflux disease)     Hyperlipidemia     Hypertension     PONV (postoperative nausea and vomiting)        Surgical History  Past Surgical History:   Procedure Laterality Date    BILATERAL OOPHORECTOMY      CARDIAC CATHETERIZATION  2004    CATARACT EXTRACTION W/ INTRAOCULAR LENS  IMPLANT, BILATERAL Bilateral     CHOLECYSTECTOMY      COLONOSCOPY      2010,2015    ESOPHAGOGASTRODUODENOSCOPY      HYSTERECTOMY      TONSILECTOMY, ADENOIDECTOMY, BILATERAL MYRINGOTOMY AND TUBES      UTERINE FIBROID SURGERY      WRIST ARTHROPLASTY Left         Social History  She reports that she has never smoked. She has never used smokeless tobacco. She reports current alcohol use of about 3.0 standard drinks of alcohol per week. She reports that she does not use drugs.    Family History  Family History   Problem Relation Name Age of Onset    Colon cancer Mother      Colon cancer Mother's Sister      Colon cancer Mother's Sister          Allergies  Ace inhibitors and Codeine    Review of Systems   Constitutional: Negative.    HENT: Negative.     Eyes: Negative.    Respiratory: Negative.     Cardiovascular: Negative.    Gastrointestinal: Negative.    Endocrine: Negative.    Genitourinary: Negative.    Skin: Negative.    Neurological: Negative.    Hematological: Negative.         Physical Exam  Constitutional:       General: She is not in acute distress.     Appearance: Normal appearance.   HENT:      Head: Normocephalic and atraumatic.   Eyes:      Extraocular Movements: Extraocular  movements intact.   Cardiovascular:      Rate and Rhythm: Normal rate and regular rhythm.      Heart sounds: No murmur heard.  Pulmonary:      Effort: Pulmonary effort is normal.      Breath sounds: Normal breath sounds.   Abdominal:      General: There is no distension.      Palpations: Abdomen is soft.      Tenderness: There is no abdominal tenderness.   Musculoskeletal:      Cervical back: Neck supple.   Skin:     General: Skin is warm and dry.   Neurological:      Mental Status: She is alert and oriented to person, place, and time.   Psychiatric:         Mood and Affect: Mood normal.         Behavior: Behavior normal.          Last Recorded Vitals  There were no vitals taken for this visit.    Relevant Results           Assessment/Plan   74 y.o. female with GERD and hypertension presenting with large colon polyp. She saw Dr. Freire in the office on March 6, 2024 at which time she felt that there was an ascending colon polyp that was difficult to remove due to a difficult sigmoid colon in the setting of CULLEN/BSO.    - Colonoscopy today, attempt polypectomy       I spent 5 minutes in the professional and overall care of this patient.      Liz Christopher MD

## 2024-05-31 ASSESSMENT — PAIN SCALES - GENERAL: PAINLEVEL_OUTOF10: 3

## 2024-06-06 LAB
LABORATORY COMMENT REPORT: NORMAL
PATH REPORT.FINAL DX SPEC: NORMAL
PATH REPORT.GROSS SPEC: NORMAL
PATH REPORT.TOTAL CANCER: NORMAL

## 2024-07-01 DIAGNOSIS — D12.2 ADENOMATOUS POLYP OF ASCENDING COLON: ICD-10-CM

## 2024-07-01 RX ORDER — POLYETHYLENE GLYCOL-3350 AND ELECTROLYTES 236; 6.74; 5.86; 2.97; 22.74 G/274.31G; G/274.31G; G/274.31G; G/274.31G; G/274.31G
POWDER, FOR SOLUTION ORAL
Qty: 4000 ML | Refills: 0 | Status: SHIPPED | OUTPATIENT
Start: 2024-07-01

## 2024-07-11 DIAGNOSIS — Z12.11 SCREENING FOR COLON CANCER: ICD-10-CM

## 2024-07-11 RX ORDER — SODIUM, POTASSIUM,MAG SULFATES 17.5-3.13G
SOLUTION, RECONSTITUTED, ORAL ORAL
Qty: 1 EACH | Refills: 0 | Status: SHIPPED | OUTPATIENT
Start: 2024-07-11

## 2024-09-12 ENCOUNTER — TELEPHONE (OUTPATIENT)
Dept: SURGERY | Facility: CLINIC | Age: 74
End: 2024-09-12
Payer: MEDICARE

## 2024-09-12 NOTE — TELEPHONE ENCOUNTER
Spoke with Viky.  She reports that she was having trouble swallowing.  Dr. Mercado ordered an EGD. She had this done 2 weeks ago.  Post procedure she had terrible n/v, diarrhea, and terrible chills for two days.  She is nervous to have anesthesia again in November when she has repeat colonoscopy with Dr. Freire.    Had no reaction at all when she had propfol in May with Dr. Freire.  She is going to find out what she was given at the time of the procedure two weeks ago.  She will call me back and I can discuss her reaction with our anesthesia team.  LINNETTE Howe pt said she had an endo procedure done w/Dr. Mercado at the end of August.  She had a severe reaction to the anesthesia (chills, falling on the floor).  She is not sure if this will affect her colo w/ on 11/14/2024.    Pt Phone [phone]

## 2024-09-18 ENCOUNTER — TELEPHONE (OUTPATIENT)
Dept: SURGERY | Facility: CLINIC | Age: 74
End: 2024-09-18
Payer: MEDICARE

## 2024-09-18 DIAGNOSIS — R19.7 DIARRHEA, UNSPECIFIED TYPE: ICD-10-CM

## 2024-09-18 NOTE — TELEPHONE ENCOUNTER
Patient calling c/o diarrhea for three weeks.  Unable to eat. She reports that she is trying to get help from her GI doctor close to home,  Dr. Mercado but she is not getting any return calls from him.  She is asking to see Dr. Freire for this problem.  I advised that Dr. Freire is a surgeon and not a gastroenterologist.  She is hoping to establish care with a GI at .    I advised that I have placed an order for GI consult.  Will ask the  to help her make an appointment.  Silvana Galeano RN

## 2024-11-07 RX ORDER — GLUCOSAM/CHONDRO/HERB 149/HYAL 750-100 MG
1000 TABLET ORAL DAILY
COMMUNITY

## 2024-11-07 RX ORDER — LOPERAMIDE HCL 2 MG
2 TABLET ORAL 2 TIMES DAILY PRN
COMMUNITY

## 2024-11-07 RX ORDER — CYANOCOBALAMIN (VITAMIN B-12) 250 MCG
500 TABLET ORAL DAILY
COMMUNITY

## 2024-11-07 RX ORDER — PRAVASTATIN SODIUM 80 MG/1
80 TABLET ORAL EVERY OTHER DAY
COMMUNITY
End: 2024-11-07 | Stop reason: SDUPTHER

## 2024-11-08 ENCOUNTER — OFFICE VISIT (OUTPATIENT)
Dept: GASTROENTEROLOGY | Facility: CLINIC | Age: 74
End: 2024-11-08
Payer: MEDICARE

## 2024-11-08 VITALS
DIASTOLIC BLOOD PRESSURE: 77 MMHG | HEART RATE: 56 BPM | HEIGHT: 59 IN | WEIGHT: 121 LBS | BODY MASS INDEX: 24.39 KG/M2 | SYSTOLIC BLOOD PRESSURE: 185 MMHG | TEMPERATURE: 98.1 F

## 2024-11-08 DIAGNOSIS — R19.7 DIARRHEA, UNSPECIFIED TYPE: ICD-10-CM

## 2024-11-08 PROCEDURE — 99214 OFFICE O/P EST MOD 30 MIN: CPT | Performed by: NURSE PRACTITIONER

## 2024-11-08 PROCEDURE — 1159F MED LIST DOCD IN RCRD: CPT | Performed by: NURSE PRACTITIONER

## 2024-11-08 PROCEDURE — 99204 OFFICE O/P NEW MOD 45 MIN: CPT | Performed by: NURSE PRACTITIONER

## 2024-11-08 PROCEDURE — 1157F ADVNC CARE PLAN IN RCRD: CPT | Performed by: NURSE PRACTITIONER

## 2024-11-08 PROCEDURE — 3078F DIAST BP <80 MM HG: CPT | Performed by: NURSE PRACTITIONER

## 2024-11-08 PROCEDURE — 3077F SYST BP >= 140 MM HG: CPT | Performed by: NURSE PRACTITIONER

## 2024-11-08 PROCEDURE — 1160F RVW MEDS BY RX/DR IN RCRD: CPT | Performed by: NURSE PRACTITIONER

## 2024-11-08 RX ORDER — DIPHENOXYLATE HYDROCHLORIDE AND ATROPINE SULFATE 2.5; .025 MG/1; MG/1
1 TABLET ORAL 4 TIMES DAILY PRN
Qty: 120 TABLET | Refills: 0 | Status: SHIPPED | OUTPATIENT
Start: 2024-11-08 | End: 2024-12-08

## 2024-11-08 ASSESSMENT — ENCOUNTER SYMPTOMS
CONSTITUTIONAL NEGATIVE: 1
DIARRHEA: 1
ALLERGIC/IMMUNOLOGIC NEGATIVE: 1
RESPIRATORY NEGATIVE: 1
ENDOCRINE NEGATIVE: 1
CARDIOVASCULAR NEGATIVE: 1
EYES NEGATIVE: 1
PSYCHIATRIC NEGATIVE: 1
MUSCULOSKELETAL NEGATIVE: 1
HEMATOLOGIC/LYMPHATIC NEGATIVE: 1
NEUROLOGICAL NEGATIVE: 1

## 2024-11-08 NOTE — PROGRESS NOTES
Subjective   Patient ID: Viky Smith is a 74 y.o. female who presents for New Patient Visit.  HPI  74-year-old female for new patient visit for evaluation of diarrhea  Referral from Dr. Ludy Freire [place]-rectal surgery  SHX; cholecystectomy 1999, hysterectomy  FHX; mom ulcerative colitis, mom colon cancer  She underwent a colonoscopy in December for screening due to previous colonoscopies with polyps that were tubulovillous adenomas in 2010 2015 normal.  In December 2023 she was found to have a mid ascending colon with what appeared to be a rather large polyp there were also 2 additional polyps 2 cm in size  5/30/2024 colonoscopy showed a 10 mm polyp in the ascending colon, 30 mm polyp in the ascending colon, 8 mm polyp in the ascending colon and diverticulosis  Pathology of multiple ascending colon polyps were tubular adenomatous polyp fragments  Will follow-up for repeat colonoscopy in 6 months she is scheduled for 11/14/2024 for this repeat colonoscopy  Endoscopy at [facility] 2 cm HH, normal stomach and duodenum with dilatation, li dilatation  Diarrhea daily  Living on immodium  Has been going on for months no matter what she eat  No stools for infection  Has the chills  Watery and loose , soft to chucks at times  No blood  Floats  Multiple times a day, even at night  Running to the bathroom  Weight stable  No hx of stable  Doesn't matter what she eats   Wine- goes right through her  Lactose intolerant  Tried colestipol and metamucil and didn't help  Viberzi - tried  Water- lots    Review of Systems   Constitutional: Negative.    HENT: Negative.     Eyes: Negative.    Respiratory: Negative.     Cardiovascular: Negative.    Gastrointestinal:  Positive for diarrhea.   Endocrine: Negative.    Genitourinary: Negative.    Musculoskeletal: Negative.    Skin: Negative.    Allergic/Immunologic: Negative.    Neurological: Negative.    Hematological: Negative.    Psychiatric/Behavioral: Negative.          Objective   Physical Exam  Constitutional:       Appearance: Normal appearance.   HENT:      Head: Normocephalic.      Nose: Nose normal.      Mouth/Throat:      Mouth: Mucous membranes are moist.   Eyes:      Pupils: Pupils are equal, round, and reactive to light.   Cardiovascular:      Rate and Rhythm: Normal rate and regular rhythm.      Pulses: Normal pulses.      Heart sounds: Normal heart sounds.   Pulmonary:      Effort: Pulmonary effort is normal.      Breath sounds: Normal breath sounds.   Abdominal:      General: Bowel sounds are normal.      Palpations: Abdomen is soft.   Musculoskeletal:         General: Normal range of motion.      Cervical back: Normal range of motion and neck supple.   Skin:     General: Skin is warm and dry.   Neurological:      Mental Status: She is alert.   Psychiatric:         Mood and Affect: Mood normal.         Assessment/Plan          Chronic diarrhea- you are taking immodium daily to control the diarrhea. I would recommend stools for c-diff, culture, ova/parasite, fecal elastace, a hydrogen breath test to assess for small intestinal bacterial overgrowth.     You are having your colonoscopy done next week and I will ask Dr. Freire to do random biopsies of your colon as well to rule out microscopic colitis as well.    I will see you back for follow up after your testing  SABRINA Nicole-CNP 11/08/24 1:04 PM

## 2024-11-08 NOTE — PATIENT INSTRUCTIONS
Chronic diarrhea- you are taking immodium daily to control the diarrhea. I would recommend stools for c-diff, culture, ova/parasite, fecal elastace, a hydrogen breath test to assess for small intestinal bacterial overgrowth. I will give you a prescription for lomotil that you can use if your stool studies come back negative.    You are having your colonoscopy done next week and I will ask Dr. Freire to do random biopsies of your colon as well to rule out microscopic colitis as well.    I will see you back for follow up after your testing

## 2024-11-14 ENCOUNTER — APPOINTMENT (OUTPATIENT)
Dept: GASTROENTEROLOGY | Facility: HOSPITAL | Age: 74
End: 2024-11-14
Payer: MEDICARE

## 2024-11-14 ENCOUNTER — LAB (OUTPATIENT)
Dept: LAB | Facility: LAB | Age: 74
End: 2024-11-14
Payer: MEDICARE

## 2024-11-14 DIAGNOSIS — R19.7 DIARRHEA, UNSPECIFIED TYPE: ICD-10-CM

## 2024-11-14 LAB — C DIF TOX TCDA+TCDB STL QL NAA+PROBE: NOT DETECTED

## 2024-11-14 PROCEDURE — 87328 CRYPTOSPORIDIUM AG IA: CPT

## 2024-11-14 PROCEDURE — 87506 IADNA-DNA/RNA PROBE TQ 6-11: CPT

## 2024-11-14 PROCEDURE — 87493 C DIFF AMPLIFIED PROBE: CPT

## 2024-11-14 PROCEDURE — 87329 GIARDIA AG IA: CPT

## 2024-11-14 PROCEDURE — 82653 EL-1 FECAL QUANTITATIVE: CPT

## 2024-11-16 LAB

## 2024-11-17 LAB
CRYPTOSP AG STL QL IA: POSITIVE
G LAMBLIA AG STL QL IA: POSITIVE

## 2024-11-18 DIAGNOSIS — A07.1 GIARDIAL COLITIS: Primary | ICD-10-CM

## 2024-11-18 LAB — ELASTASE PANC STL-MCNT: 151 UG/G

## 2024-11-18 RX ORDER — METRONIDAZOLE 500 MG/1
500 TABLET ORAL 2 TIMES DAILY
Qty: 28 TABLET | Refills: 0 | Status: SHIPPED | OUTPATIENT
Start: 2024-11-18 | End: 2024-12-02

## 2024-11-21 ENCOUNTER — APPOINTMENT (OUTPATIENT)
Dept: GASTROENTEROLOGY | Facility: HOSPITAL | Age: 74
End: 2024-11-21
Payer: MEDICARE

## 2024-11-23 LAB — O+P STL MICRO: NEGATIVE

## 2025-01-23 ENCOUNTER — ANESTHESIA EVENT (OUTPATIENT)
Dept: GASTROENTEROLOGY | Facility: HOSPITAL | Age: 75
End: 2025-01-23
Payer: MEDICARE

## 2025-01-23 ENCOUNTER — ANESTHESIA (OUTPATIENT)
Dept: GASTROENTEROLOGY | Facility: HOSPITAL | Age: 75
End: 2025-01-23
Payer: MEDICARE

## 2025-01-23 ENCOUNTER — HOSPITAL ENCOUNTER (OUTPATIENT)
Dept: GASTROENTEROLOGY | Facility: HOSPITAL | Age: 75
Discharge: HOME | End: 2025-01-23
Payer: MEDICARE

## 2025-01-23 VITALS
HEIGHT: 59 IN | TEMPERATURE: 97.2 F | OXYGEN SATURATION: 96 % | HEART RATE: 50 BPM | BODY MASS INDEX: 23.64 KG/M2 | WEIGHT: 117.28 LBS | DIASTOLIC BLOOD PRESSURE: 65 MMHG | SYSTOLIC BLOOD PRESSURE: 137 MMHG | RESPIRATION RATE: 14 BRPM

## 2025-01-23 DIAGNOSIS — K59.1 FUNCTIONAL DIARRHEA: Primary | ICD-10-CM

## 2025-01-23 DIAGNOSIS — R19.7 DIARRHEA, UNSPECIFIED TYPE: ICD-10-CM

## 2025-01-23 DIAGNOSIS — D12.2 ADENOMATOUS POLYP OF ASCENDING COLON: ICD-10-CM

## 2025-01-23 PROBLEM — K21.9 GASTROESOPHAGEAL REFLUX DISEASE: Status: ACTIVE | Noted: 2025-01-23

## 2025-01-23 PROBLEM — T88.59XA DELAYED EMERGENCE FROM ANESTHESIA: Status: ACTIVE | Noted: 2025-01-23

## 2025-01-23 PROBLEM — E03.9 HYPOTHYROIDISM: Status: ACTIVE | Noted: 2025-01-23

## 2025-01-23 PROCEDURE — 45385 COLONOSCOPY W/LESION REMOVAL: CPT | Performed by: COLON & RECTAL SURGERY

## 2025-01-23 PROCEDURE — 2500000005 HC RX 250 GENERAL PHARMACY W/O HCPCS: Performed by: REGISTERED NURSE

## 2025-01-23 PROCEDURE — 3700000001 HC GENERAL ANESTHESIA TIME - INITIAL BASE CHARGE

## 2025-01-23 PROCEDURE — 7100000009 HC PHASE TWO TIME - INITIAL BASE CHARGE

## 2025-01-23 PROCEDURE — 2500000004 HC RX 250 GENERAL PHARMACY W/ HCPCS (ALT 636 FOR OP/ED): Performed by: REGISTERED NURSE

## 2025-01-23 PROCEDURE — 3700000002 HC GENERAL ANESTHESIA TIME - EACH INCREMENTAL 1 MINUTE

## 2025-01-23 PROCEDURE — 45380 COLONOSCOPY AND BIOPSY: CPT | Performed by: COLON & RECTAL SURGERY

## 2025-01-23 PROCEDURE — 7100000010 HC PHASE TWO TIME - EACH INCREMENTAL 1 MINUTE

## 2025-01-23 RX ORDER — LIDOCAINE HYDROCHLORIDE 20 MG/ML
INJECTION, SOLUTION EPIDURAL; INFILTRATION; INTRACAUDAL; PERINEURAL AS NEEDED
Status: DISCONTINUED | OUTPATIENT
Start: 2025-01-23 | End: 2025-01-23

## 2025-01-23 RX ORDER — NORETHINDRONE AND ETHINYL ESTRADIOL 0.5-0.035
KIT ORAL AS NEEDED
Status: DISCONTINUED | OUTPATIENT
Start: 2025-01-23 | End: 2025-01-23

## 2025-01-23 RX ORDER — PROPOFOL 10 MG/ML
INJECTION, EMULSION INTRAVENOUS AS NEEDED
Status: DISCONTINUED | OUTPATIENT
Start: 2025-01-23 | End: 2025-01-23

## 2025-01-23 RX ORDER — ONDANSETRON HYDROCHLORIDE 2 MG/ML
INJECTION, SOLUTION INTRAVENOUS AS NEEDED
Status: DISCONTINUED | OUTPATIENT
Start: 2025-01-23 | End: 2025-01-23

## 2025-01-23 RX ADMIN — PROPOFOL 20 MG: 10 INJECTION, EMULSION INTRAVENOUS at 12:26

## 2025-01-23 RX ADMIN — GLYCOPYRROLATE 0.2 MG: 0.2 INJECTION, SOLUTION INTRAMUSCULAR; INTRAVENOUS at 12:18

## 2025-01-23 RX ADMIN — EPHEDRINE SULFATE 15 MG: 50 INJECTION, SOLUTION INTRAVENOUS at 12:21

## 2025-01-23 RX ADMIN — PROPOFOL 20 MG: 10 INJECTION, EMULSION INTRAVENOUS at 12:14

## 2025-01-23 RX ADMIN — LIDOCAINE HYDROCHLORIDE 60 MG: 20 INJECTION, SOLUTION EPIDURAL; INFILTRATION; INTRACAUDAL; PERINEURAL at 12:09

## 2025-01-23 RX ADMIN — EPHEDRINE SULFATE 10 MG: 50 INJECTION, SOLUTION INTRAVENOUS at 12:20

## 2025-01-23 RX ADMIN — PROPOFOL 75 MCG/KG/MIN: 10 INJECTION, EMULSION INTRAVENOUS at 12:10

## 2025-01-23 RX ADMIN — PROPOFOL 20 MG: 10 INJECTION, EMULSION INTRAVENOUS at 12:29

## 2025-01-23 RX ADMIN — PROPOFOL 50 MG: 10 INJECTION, EMULSION INTRAVENOUS at 12:09

## 2025-01-23 RX ADMIN — ONDANSETRON 4 MG: 2 INJECTION INTRAMUSCULAR; INTRAVENOUS at 12:55

## 2025-01-23 ASSESSMENT — COLUMBIA-SUICIDE SEVERITY RATING SCALE - C-SSRS
1. IN THE PAST MONTH, HAVE YOU WISHED YOU WERE DEAD OR WISHED YOU COULD GO TO SLEEP AND NOT WAKE UP?: NO
2. HAVE YOU ACTUALLY HAD ANY THOUGHTS OF KILLING YOURSELF?: NO
6. HAVE YOU EVER DONE ANYTHING, STARTED TO DO ANYTHING, OR PREPARED TO DO ANYTHING TO END YOUR LIFE?: NO

## 2025-01-23 ASSESSMENT — PAIN - FUNCTIONAL ASSESSMENT
PAIN_FUNCTIONAL_ASSESSMENT: 0-10
PAIN_FUNCTIONAL_ASSESSMENT: UNABLE TO SELF-REPORT
PAIN_FUNCTIONAL_ASSESSMENT: 0-10

## 2025-01-23 ASSESSMENT — PAIN SCALES - GENERAL
PAINLEVEL_OUTOF10: 0 - NO PAIN
PAIN_LEVEL: 0
PAINLEVEL_OUTOF10: 0 - NO PAIN

## 2025-01-23 NOTE — LETTER
Ludy Freire MD   [facility] [place][note 3]   [address] [address]   [phone]        Dear Ms. Smith,       It was my pleasure to see you again at your recent colonoscopy.  At that time,  you were noted to have 4 polyps in the cecum and ascending colon.  The polyps were completely excised and pathology returned the adenomatous type.  Which are a precancerous type of polyp if not removed, but yours were completely resected.  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  It would be worthwhile to think about a sigmoid resection to see if your symptoms improve.  Please call my office if you would like to discuss more. The current recommendation is to repeat the colonoscopy in 3 years.       Thank you very much for allowing me to take part in your care, please feel free to contact me with any questions or concerns at [phone].          Sincerely,       Ludy Freire M.D. FACS, FASCRS    CC:  Primary Care:

## 2025-01-23 NOTE — H&P
History Of Present Illness  Viky Smith is a 74 y.o. female presenting for interval screening colonoscopy after large polyp removal in May 2024. She feels well today and has no complaints.      Past Medical History  Past Medical History:   Diagnosis Date    Abdominal adhesions     Anxiety     Arthritis     Chronic diarrhea     Colon polyp     Delayed emergence from general anesthesia     Diarrhea     Diverticulosis     GERD (gastroesophageal reflux disease)     Hernia, internal     Hiatal hernia     Hyperlipidemia     Hypertension     Hypothyroidism     Irritable bowel syndrome     Motion sickness     PONV (postoperative nausea and vomiting)        Surgical History  Past Surgical History:   Procedure Laterality Date    APPENDECTOMY  1982    BILATERAL OOPHORECTOMY      CARDIAC CATHETERIZATION  2004    CATARACT EXTRACTION W/ INTRAOCULAR LENS  IMPLANT, BILATERAL Bilateral     CHOLECYSTECTOMY      COLONOSCOPY      2010,2015    ESOPHAGOGASTRODUODENOSCOPY      ESOPHAGUS SURGERY  2024    HYSTERECTOMY      TONSILECTOMY, ADENOIDECTOMY, BILATERAL MYRINGOTOMY AND TUBES      denies tubes    UTERINE FIBROID SURGERY      WRIST ARTHROPLASTY Left         Social History  She reports that she has never smoked. She has never used smokeless tobacco. She reports current alcohol use of about 2.0 standard drinks of alcohol per week. She reports that she does not use drugs.    Family History  Family History   Problem Relation Name Age of Onset    Colon cancer Mother Mariela Person     Colon polyps Mother Mariela Person     Ulcerative colitis Mother Mariela Person     Colon cancer Mother's Sister      Colon cancer Mother's Sister      Hyperlipidemia Father Yoel Person     Hypertension Father oYel Person     Heart attack Father Yoel Person     Cirrhosis Mother's Brother Fransisco Piper     Colon cancer Mother's Sister Marlee Nathaniel     Esophageal cancer Mother's Sister Paty Nancy         Allergies  Ace inhibitors, Codeine,  "Levofloxacin, Olmesartan, and Escitalopram    Review of Systems     12 point ROS was negative     Physical Exam    Physical Exam:   Constitutional: well appearing, resting comfortably  Eyes: EOMI  Head/Neck: NCAT  Respiratory: nonlabored breathing on room air  Cardiovascular: regular rate  Abdominal: soft, nontender, nondistended, no rebound or guarding  MSK: moves all extremities  Extremities: no lower extremity edema  Skin: warm and well perfused, no rashes  Psych: appropriate mood and behavior       Last Recorded Vitals  Blood pressure (!) 188/73, pulse 61, temperature 36 °C (96.8 °F), temperature source Temporal, resp. rate 15, height 1.499 m (4' 11\"), weight 53.2 kg (117 lb 4.6 oz), SpO2 97%.    Relevant Results  Colonoscopy from May 2024:  Impression  One 10 mm polyp in the ascending colon; performed hot snare removal  30 mm polyp in the ascending colon; removed in piecemeal fashion by EMR; placed clips  8 mm polyp in the ascending colon; performed cold snare with piecemeal removal  Diverticulosis of mild severity in the sigmoid colon        Findings  One 10 mm adenomatous-appearing polyp in the ascending colon; performed hot snare with complete en bloc removal and retrieved specimen  30 mm sessile, adenomatous-appearing polyp in the ascending colon; completely removed target lesion in piecemeal fashion by EMR and retrieved specimen. EMR was performed with a hot snare. Post-procedure bleeding was not visualized; placed clips. Injected with blue boost with great lift throughout, difficult fold, but we were able to completely remove the polyp  8 mm sessile polyp in the ascending colon; performed cold snare with complete piecemeal removal and retrieved specimen  Multiple medium diverticula of mild severity in the sigmoid colon     Assessment/Plan   Assessment & Plan  Adenomatous polyp of ascending colon      Plan for colonoscopy today, plan for DC post procedure.              Dejon Pablo MD    "

## 2025-01-23 NOTE — NURSING NOTE
1303 Pt arrived to [facility] , pt on monitor and report given. Pt in phase 2 care     1316 Dr. Freire Speaking with pt and family     1322 All DC instructions given to pt and family     1333 Phase 2 care complete     1344 Pt dressed with family at bedside    1349 IV removed    1353 Pt to lobby with transport

## 2025-01-23 NOTE — DISCHARGE INSTRUCTIONS
Patient Instructions after a Colonoscopy      The anesthetics, sedatives or narcotics which were given to you today will be acting in your body for the next 24 hours, so you might feel a little sleepy or groggy.  This feeling should slowly wear off. Carefully read and follow the instructions.     You received sedation today:  - Do not drive or operate any machinery or power tools of any kind.   - No alcoholic beverages today, not even beer or wine.  - Do not make any important decisions or sign any legal documents.  - No over the counter medications that contain alcohol or that may cause drowsiness.  - Do not make any important decisions or sign any legal documents.  - Make sure you have someone with you for first 24 hours.    While it is common to experience mild to moderate abdominal distention, gas, or belching after your procedure, if any of these symptoms occur following discharge from the GI Lab or within one week of having your procedure, call the [facility] to be advised whether a visit to your nearest Urgent Care or Emergency Department is indicated.  Take this paper with you if you go.     - If you develop an allergic reaction to the medications that were given during your procedure such as difficulty breathing, rash, hives, severe nausea, vomiting or lightheadedness.  - If you experience chest pain, shortness of breath, severe abdominal pain, fevers and chills.  -If you develop signs and symptoms of bleeding such as blood in your spit, if your stools turn black, tarry, or bloody  - If you have not urinated within 8 hours following your procedure.  - If your IV site becomes painful, red, inflamed, or looks infected.    If you received a biopsy/polypectomy/sphincterotomy the following instructions apply below:    __ Do not use Aspirin containing products, non-steroidal medications or anti-coagulants for one week following your procedure. (Examples of these types of medications are: Advil,  Arthrotec, Aleve, Coumadin, Ecotrin, Heparin, Ibuprofen, Indocin, Motrin, Naprosyn, Nuprin, Plavix, Vioxx, and Voltarin, or their generic forms.  This list is not all-inclusive.  Check with your physician or pharmacist before resuming medications.)   __ Eat a soft diet today.  Avoid foods that are poorly digested for the next 24 hours.  These foods would include: nuts, beans, lettuce, red meats, and fried foods. Start with liquids and advance your diet as tolerated, gradually work up to eating solids.   __ Do not have a Barium Study or Enema for one week.    Your physician recommends the additional following instructions:    -You have a contact number available for emergencies. The signs and symptoms of potential delayed complications were discussed with you. You may return to normal activities tomorrow.  -Resume your previous diet.  -Continue your present medications.   -We are waiting for your pathology results.  -Your physician has recommended a repeat colonoscopy (date to be determined after pending pathology results are reviewed) for surveillance based on pathology results.  -The findings and recommendations have been discussed with you.  -The findings and recommendations were discussed with your family.  - Please see Medication Reconciliation Form for new medication/medications prescribed.       If you experience any problems or have any questions following discharge from the GI Lab, please call:        Nurse Signature                                                                        Date___________________                                                                            Patient/Responsible Party Signature                                        Date___________________

## 2025-01-23 NOTE — ANESTHESIA PREPROCEDURE EVALUATION
Patient: Viky Smith    Procedure Information       Date/Time: 01/23/25 1110    Scheduled providers: Ludy Freire MD; Gaetano May MD; Steven Courtney RN; Maricarmen Diamond MA    Procedure: COLONOSCOPY    Location: [facility] [place]            Relevant Problems   Anesthesia   (+) Delayed emergence from anesthesia      Cardiac   (+) Essential hypertension   (+) Hyperlipidemia      GI  Colon polyps   (+) Gastroesophageal reflux disease      Endocrine   (+) Hypothyroidism      HEENT   (+) Sinusitis, chronic       Clinical information reviewed:   Tobacco  Allergies  Meds   Med Hx  Surg Hx   Fam Hx  Soc Hx        NPO Detail:  NPO/Void Status  Carbohydrate Drink Given Prior to Surgery? : N  Date of Last Liquid: 01/23/25  Time of Last Liquid: 0700  Date of Last Solid: 01/21/25  Time of Last Solid: 1700  Last Intake Type: Clear fluids  Time of Last Void: 1030         Physical Exam    Airway  Mallampati: III     Cardiovascular    Dental    Pulmonary    Abdominal            Anesthesia Plan    History of general anesthesia?: yes  History of complications of general anesthesia?: no    ASA 3     MAC     intravenous induction   Anesthetic plan and risks discussed with patient.

## 2025-01-23 NOTE — ANESTHESIA POSTPROCEDURE EVALUATION
Patient: Viky Smith    Procedure Summary       Date: 01/23/25 Room / Location: [facility]    Anesthesia Start: 1205 Anesthesia Stop: 1308    Procedure: COLONOSCOPY Diagnosis: Adenomatous polyp of ascending colon    Scheduled Providers: Ludy Freire MD; Gaetano May MD; Steven Courtney RN; Maricarmen Diamond MA Responsible Provider: Gaetano May MD    Anesthesia Type: MAC ASA Status: 3            Anesthesia Type: MAC    Vitals Value Taken Time   /68 01/23/25 1303   Temp 36.2 °C (97.2 °F) 01/23/25 1303   Pulse 59 01/23/25 1303   Resp 20 01/23/25 1303   SpO2 95 % 01/23/25 1303       Anesthesia Post Evaluation    Patient location during evaluation: bedside  Patient participation: complete - patient cannot participate  Level of consciousness: awake  Pain score: 0  Pain management: adequate  Airway patency: patent  Cardiovascular status: acceptable and hemodynamically stable  Respiratory status: acceptable and nonlabored ventilation  Hydration status: acceptable  Postoperative Nausea and Vomiting: none        No notable events documented.

## 2025-01-24 ASSESSMENT — PAIN SCALES - GENERAL: PAINLEVEL_OUTOF10: 7

## 2025-01-29 LAB
LABORATORY COMMENT REPORT: NORMAL
PATH REPORT.FINAL DX SPEC: NORMAL
PATH REPORT.GROSS SPEC: NORMAL
PATH REPORT.RELEVANT HX SPEC: NORMAL
PATH REPORT.TOTAL CANCER: NORMAL

## 2025-02-07 ENCOUNTER — PROCEDURE VISIT (OUTPATIENT)
Dept: GASTROENTEROLOGY | Facility: CLINIC | Age: 75
End: 2025-02-07
Payer: MEDICARE

## 2025-02-07 DIAGNOSIS — R19.7 DIARRHEA, UNSPECIFIED TYPE: ICD-10-CM

## 2025-02-07 PROCEDURE — 91065 BREATH HYDROGEN/METHANE TEST: CPT | Performed by: NURSE PRACTITIONER

## 2025-02-07 PROCEDURE — 91065 BREATH HYDROGEN/METHANE TEST: CPT

## 2025-02-07 NOTE — PROGRESS NOTES
Patient ID: Viky Smith is a 74 y.o. female.    Breath Hydrogen Test-Dextrose    Date/Time: 2/7/2025 12:52 PM    Performed by: Jim Thomas MA  Authorized by: LAUREN Nicole    Consent:     Consent obtained:  Verbal    Consent given by:  Patient  Universal protocol:     Procedure explained and questions answered to patient or proxy's satisfaction: yes      Relevant documents present and verified: yes      Test results available: yes      Patient identity confirmed:  Verbally with patient  Sedation:     Sedation type:  None  Anesthesia:     Anesthesia method:  None  Post-procedure details:     Procedure completion:  Tolerated  Hydrogen Breath Analysis Consultation Sheet    Referring Provider: LAUREN Nicole  [address] [facility]  Department Of Medicine-[place] [address]    Indication: R/O SIBO    Symptoms: Diarrhea, unspecified type (R19.7)    Age: 74 y.o.  Weight: There were no vitals filed for this visit.  Substrate: Glucose   Dose: 75 grams    Last Meal: 02/06/2025 1930  Recent Antibiotics: Denies    RESULTS:   Time PPM (H2) APPM* (CH4) CO2 Correction   Baseline #1 0925 3 21 3.4 1.61   Baseline #2 0927 3 23 3.5 1.57   *Challenge Dose Sugar: 0930  15' 0945 4 19 3.1 1.77   30' 1000 3 22 3.2 1.71   45' 1015 3 22 3.2 1.71   60' 1030 3 20 3.3 1.66   75' 1045 3 16 3.5 1.57   90' 1100 3 12 3.7 1.48   105'        120'        135'        150'        165'        180'          Impression: Negative for SIBO and baseline positive for methane

## 2025-02-11 ENCOUNTER — HOSPITAL ENCOUNTER (OUTPATIENT)
Dept: RADIOLOGY | Facility: HOSPITAL | Age: 75
Discharge: HOME | End: 2025-02-11
Payer: MEDICARE

## 2025-02-11 DIAGNOSIS — K59.1 FUNCTIONAL DIARRHEA: ICD-10-CM

## 2025-02-11 LAB
CREAT SERPL-MCNC: 0.73 MG/DL (ref 0.5–1.05)
EGFRCR SERPLBLD CKD-EPI 2021: 86 ML/MIN/1.73M*2

## 2025-02-11 PROCEDURE — 74177 CT ABD & PELVIS W/CONTRAST: CPT

## 2025-02-11 PROCEDURE — 2550000001 HC RX 255 CONTRASTS: Mod: 59 | Performed by: COLON & RECTAL SURGERY

## 2025-02-11 PROCEDURE — 36415 COLL VENOUS BLD VENIPUNCTURE: CPT | Performed by: COLON & RECTAL SURGERY

## 2025-02-11 PROCEDURE — 82565 ASSAY OF CREATININE: CPT | Performed by: COLON & RECTAL SURGERY

## 2025-02-11 RX ORDER — DIATRIZOATE MEGLUMINE AND DIATRIZOATE SODIUM 660; 100 MG/ML; MG/ML
30 SOLUTION ORAL; RECTAL ONCE
Status: COMPLETED | OUTPATIENT
Start: 2025-02-11 | End: 2025-02-11

## 2025-02-11 RX ADMIN — DIATRIZOATE MEGLUMINE AND DIATRIZOATE SODIUM 30 ML: 660; 100 LIQUID ORAL; RECTAL at 12:58

## 2025-02-11 RX ADMIN — IOHEXOL 80 ML: 350 INJECTION, SOLUTION INTRAVENOUS at 12:57

## 2025-02-12 LAB
CREAT SERPL-MCNC: 0.74 MG/DL (ref 0.6–1)
EGFRCR SERPLBLD CKD-EPI 2021: 84 ML/MIN/1.73M2

## 2025-02-13 DIAGNOSIS — K63.8219 SMALL INTESTINAL BACTERIAL OVERGROWTH (SIBO): Primary | ICD-10-CM

## 2025-02-13 RX ORDER — METRONIDAZOLE 500 MG/1
500 TABLET ORAL 2 TIMES DAILY
Qty: 28 TABLET | Refills: 0 | Status: SHIPPED | OUTPATIENT
Start: 2025-02-13 | End: 2025-02-27

## 2025-02-14 DIAGNOSIS — R19.7 DIARRHEA, UNSPECIFIED TYPE: Primary | ICD-10-CM

## 2025-02-14 RX ORDER — AMOXICILLIN AND CLAVULANATE POTASSIUM 875; 125 MG/1; MG/1
875 TABLET, FILM COATED ORAL 2 TIMES DAILY
Qty: 20 TABLET | Refills: 0 | Status: SHIPPED | OUTPATIENT
Start: 2025-02-14 | End: 2025-02-24

## 2025-03-31 ENCOUNTER — TELEPHONE (OUTPATIENT)
Dept: SURGERY | Facility: CLINIC | Age: 75
End: 2025-03-31
Payer: MEDICARE

## 2025-03-31 NOTE — TELEPHONE ENCOUNTER
Returned call to patient. Message left inviting her to reach me at [phone].  Silvana Galeano RN        From: Gideon Riggins <[email or web address]>  Sent: Monday, March 24, 2025 9:11 AM  To: Silvana Galeano <[email or web address]>  Subject: Viky Smith [id number]     [facility] pt said she got the colonoscopy letter from  and wants to discuss further.  She made a fuv on 6/25 but she is not sure if this can be done as a phone call to discuss things or if she needs to come into the office.    Phone [phone]

## 2025-04-03 ENCOUNTER — OFFICE VISIT (OUTPATIENT)
Dept: GASTROENTEROLOGY | Facility: CLINIC | Age: 75
End: 2025-04-03
Payer: MEDICARE

## 2025-04-03 ENCOUNTER — SPECIALTY PHARMACY (OUTPATIENT)
Dept: PHARMACY | Facility: CLINIC | Age: 75
End: 2025-04-03

## 2025-04-03 VITALS
SYSTOLIC BLOOD PRESSURE: 181 MMHG | HEIGHT: 59 IN | DIASTOLIC BLOOD PRESSURE: 75 MMHG | WEIGHT: 112 LBS | HEART RATE: 53 BPM | TEMPERATURE: 97.9 F | BODY MASS INDEX: 22.58 KG/M2

## 2025-04-03 DIAGNOSIS — K86.89 PANCREATIC INSUFFICIENCY (HHS-HCC): Primary | ICD-10-CM

## 2025-04-03 DIAGNOSIS — K64.9 HEMORRHOIDS, UNSPECIFIED HEMORRHOID TYPE: ICD-10-CM

## 2025-04-03 PROCEDURE — 99214 OFFICE O/P EST MOD 30 MIN: CPT | Performed by: NURSE PRACTITIONER

## 2025-04-03 PROCEDURE — 1159F MED LIST DOCD IN RCRD: CPT | Performed by: NURSE PRACTITIONER

## 2025-04-03 PROCEDURE — 1157F ADVNC CARE PLAN IN RCRD: CPT | Performed by: NURSE PRACTITIONER

## 2025-04-03 PROCEDURE — 3077F SYST BP >= 140 MM HG: CPT | Performed by: NURSE PRACTITIONER

## 2025-04-03 PROCEDURE — 3078F DIAST BP <80 MM HG: CPT | Performed by: NURSE PRACTITIONER

## 2025-04-03 PROCEDURE — 1125F AMNT PAIN NOTED PAIN PRSNT: CPT | Performed by: NURSE PRACTITIONER

## 2025-04-03 PROCEDURE — 1160F RVW MEDS BY RX/DR IN RCRD: CPT | Performed by: NURSE PRACTITIONER

## 2025-04-03 RX ORDER — BENZONATATE 100 MG/1
1 CAPSULE ORAL EVERY 8 HOURS PRN
COMMUNITY
Start: 2024-11-21

## 2025-04-03 RX ORDER — PREDNISONE 10 MG/1
10 TABLET ORAL 2 TIMES DAILY
COMMUNITY
Start: 2025-03-26

## 2025-04-03 RX ORDER — HYDROCORTISONE 25 MG/G
CREAM TOPICAL 2 TIMES DAILY
Qty: 28 G | Refills: 2 | Status: SHIPPED | OUTPATIENT
Start: 2025-04-03

## 2025-04-03 RX ORDER — PANCRELIPASE 24000; 76000; 120000 [USP'U]/1; [USP'U]/1; [USP'U]/1
CAPSULE, DELAYED RELEASE PELLETS ORAL
Qty: 300 CAPSULE | Refills: 11 | Status: SHIPPED | OUTPATIENT
Start: 2025-04-03

## 2025-04-03 ASSESSMENT — PAIN SCALES - GENERAL: PAINLEVEL_OUTOF10: 4

## 2025-04-03 NOTE — PROGRESS NOTES
Subjective   Patient ID: Viky Smith is a 74 y.o. female.    HPI  74-year-old female for follow-up of chronic diarrhea  Previously seen 11/8/2020 for  Stool studies 11/14/2024 were negative for C. difficile O&P and culture  Fecal elastase was 151  She was positive for Giardia and treated with metronidazole  Hydrogen breath test was positive for methane  1/23/2025 colonoscopy showed 4 polyps there were tubular adenomas, no significant path on random biopsies and diverticulosis  2/11/2025 CT abdomen pelvis showed no acute process, status post [place], hepatic steatosis, diverticulosis  Antibiotics seemed to help  Some formed stools and loose but better  Hemorrhoids are bothering her from going to the bathroom  Objective   Physical Exam  Cardiovascular:      Rate and Rhythm: Normal rate and regular rhythm.      Pulses: Normal pulses.      Heart sounds: Normal heart sounds.   Pulmonary:      Effort: Pulmonary effort is normal.      Breath sounds: Normal breath sounds.   Abdominal:      General: Bowel sounds are normal.      Palpations: Abdomen is soft.         Assessment/Plan     Chronic diarrhea- you have been treated for giardia and the methane on your hydrogen breath test and have still had some loose stools. I would recommend starting on the pancreatic enzymes for pancreatic insufficency to help control your diarrhea.     You will take 1-2 capsules with  a snack and 2-3 with meals, take a bite of your food and then take the capsule.    Narrowing in your colon associated with diverticulosis- I would recommend seeing how you doing and then follow up with Dr. Freire to determine  if you need resect the region of your colon.    Please let me know how you are doing 3-4 days after starting the creon.     I would recommend follow up with Dr. Tony Moss

## 2025-04-03 NOTE — PATIENT INSTRUCTIONS
Chronic diarrhea- you have been treated for giardia and the methane on your hydrogen breath test and have still had some loose stools. I would recommend starting on the pancreatic enzymes for pancreatic insufficency to help control your diarrhea. Start on a fiber supplement daily such as benefiber to help regulate your stools and prevent stool from getting stuck in the diverticulosis pockets    You will take 1-2 capsules with  a snack and 2-3 with meals, take a bite of your food and then take the capsule.    Narrowing in your colon associated with diverticulosis- I would recommend seeing how you doing and then follow up with Dr. Freire to determine  if you need resect the region of your colon.    Please let me know how you are doing 3-4 days after starting the creon.     I would recommend follow up with Dr. Tony Moss

## 2025-04-09 ENCOUNTER — SPECIALTY PHARMACY (OUTPATIENT)
Dept: PHARMACY | Facility: CLINIC | Age: 75
End: 2025-04-09

## 2025-04-09 PROCEDURE — RXMED WILLOW AMBULATORY MEDICATION CHARGE

## 2025-04-11 ENCOUNTER — PHARMACY VISIT (OUTPATIENT)
Dept: PHARMACY | Facility: CLINIC | Age: 75
End: 2025-04-11
Payer: COMMERCIAL

## 2025-05-01 ENCOUNTER — SPECIALTY PHARMACY (OUTPATIENT)
Dept: PHARMACY | Facility: CLINIC | Age: 75
End: 2025-05-01

## 2025-05-01 PROCEDURE — RXMED WILLOW AMBULATORY MEDICATION CHARGE

## 2025-05-03 ENCOUNTER — PHARMACY VISIT (OUTPATIENT)
Dept: PHARMACY | Facility: CLINIC | Age: 75
End: 2025-05-03
Payer: COMMERCIAL

## 2025-05-15 NOTE — PROGRESS NOTES
Viky Smith is a 75 year old female with history of a difficult ascending colon polyp, diverticular disease  and diarrhea, returns to the office to discuss sigmoid resection.  A CT Scan was ordered after the January 2025 colonoscopy to evaluate diarrhea.   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     No prior episodes of diverticulitis.  She reports that Pia Del Real NP was treating her for a parasite and took 17 days of ATB.    Pia Del Real NP put her on Creon about one month ago. It has helped with the diarrhea.  Before the Creon, she was moving her bowels 6-8 times per day.  Frequent stool accidents.   On the Creon she is 3 times per day - formed.  1 inch diameter.  She has severe cramps with diarrhea   Stool consistency is more formed now.  She has not had any fecal incontinence since the Creon.    History:  11/15/2010 Colonoscopy to cecum  Digital rectal exam was normal.  There was redundancy and angulation of the sigmoid colon and the lumen could just not be visualized and no attempt at blind passage.  Only an occasional diverticulum was noted.  Position changes and withdrawal and reinsertion did not allow me to navigate through the sigmoid colon.  With the patient in left lateral, an Olympus GIF-180 video scope was guided into the rectum and used as a colonoscope.  It was a little bit easier but it still took time to visualize the lumen and allow navigation to the cecum.  Two small polyps were located in the ascending colon.  One was about 2 mm in sizs and the other was about 5 mm in size or less. Both were removed with a cold snare.  There were no inflammatory changes. The instrument was retrieved decompressing the colon and examination was unremarkable otherwise.  I did take random mucosal biopsies to rule out lymphocytic or collagenous colitis.  Pathology:    A. Ascending colon, polypectomy: mixed tubulovillous adenoma fragments  B.  Colon, random biopsy: Colonic mucosa, no  significant pathologic changes.     12/15/2015 Colonoscopy to cecum:    Normal, No metachronous lesions     12/18/2023 Colonoscopy to cecum  Colonoscope was introduced into the rectum.  The rectum was normal.  The instrument was passed proximally to a depth of 30 cm.  It was sharply angulated.  Some diverticular disease was present.  The lumen was not entirely visualized.  Withdrawn, reinserted.  I could not safely navigate this area.  The endoscope was withdrawn.  The Olympus [id number] vidoe endoscope was inserted in it's place.  It was guided proximally. The lumen was identified and ultimately the area was traversed. Cecal intubation was achieve or at least to the level of the ileocecal valve and could visualize the cecum well but actual intubation to the cap of the cecum did not occur.  On withdrawal, in the mid ascending colon was what appeared to be a rather large polyp.  It was at least two cm plus in size. But I could not fully identify the distal margin.  I could not get below it to retroflex it.  I tried to position it.  I grabbed a portion with a snare, but did not transect.  Again I did not think I would be able to remove the whole lesion as it was not appropriately oriented and I was uncertain of size.  I decided to leave it. The instrument was retrieved.  The colon was decompressed.  Diverticular disease in the left colon, but nothing more.  I attempted the pediatric colonoscope once again both supine and left lateral and again failed to navigate the sigmoid.  The procedure was terminated.     1/23/2025 Colonoscopy to TI (Repeat 3 years)  Findings  One 3 mm polyp in the cecum; performed cold forceps biopsy with complete en bloc removal  The terminal ileum appeared normal. Performed random biopsy using biopsy forceps. Rule out Crohn's  The entire colon appeared normal. Performed random biopsy using biopsy forceps. Right and left rule out microscopic colitis  Four sessile polyps measuring from 4 mm up to 10  mm in the ascending colon; performed cold snare with complete en bloc removal and retrieved specimen  Multiple medium diverticula of moderate severity causing moderate luminal narrowing (traversable). Fixed narrowed sigmoid  Pathology  A. TERMINAL ILEUM BIOPSY:   -Small bowel mucosa, no significant pathologic finding     B. COLON -CECUM POLYP:   -Tubular adenoma     C. COLON  - RANDOM BIOPSY:   -Colonic mucosa, no significant pathologic findings     D. COLON - ASCENDING POLYP:   -Fragments of tubular adenoma     E. COLON  - RANDOM BIOPSY:   -Colonic mucosa, no significant pathologic findings    2/12/2025 CT Abd/Pelvis with contrast  1. No acute abdominal or pelvic pathology.  2. Status post cholecystectomy.  3. Fatty infiltration of the liver.  4. Benign renal cysts.  5. Descending colonic and sigmoid diverticula with no diverticulitis.  6. Constipation.  7. Status post hysterectomy.  8. Status post ventral abdominal wall hernia repair.    Medical History:  HTN  HLD  Arthritis  Colon polyps    Surgical History:  Tonsillectomy  Oopherectomy  Hysterectomy  Cholecystectomy  Wrist Repair/Titanium plate and screws    Family history  Mother: Colon cancer, UC, Diverticulitis  Father: CAD    Visit Vitals  BP (!) 183/84   Pulse 58   Temp 36.5 °C (97.7 °F)   Wt 52.2 kg (115 lb)   SpO2 96%   BMI 23.23 kg/m²   Smoking Status Never   BSA 1.47 m²        Review of Systems  Constitutional: Negative for fever, chills, anorexia, weight loss, malaise             ENMT: Negative for nasal discharge, congestion, ear pain, mouth pain, throat pain                 Respiratory: Negative for cough, hemoptysis, wheezing, shortness of breath                Cardiac: Negative for chest pain, dyspnea on exertion, orthopnea, palpitations, syncope , (+)CAD , (+)HTN, (+)HLD       Gastrointestinal: Negative for nausea, vomiting, diarrhea, constipation, abdominal pain, (+)DIVERTICULAR DISEASE, (+)DIARRHEA, (+)ASCENDING COLON POLYP     Genitourinary:  Negative for discharge, dysuria, flank pain, frequency, hematuria          Musculoskeletal: Negative for decreased ROM, pain, swelling, weakness          Neurological: Negative for dizziness, confusion, headache, seizures, syncope               Psychiatric: Negative for mood changes, anxiety, hallucinations, sleep changes, suicidal ideas , (+)ANXIETY       Skin: Negative for mass, pain, itching, rash, ulcer            Endocrine: Negative for heat intolerance, cold intolerance, excessive sweating, polyuria, excess thirst , (+)HYPOTHYROIDISM       Hematologic/Lymph: Negative for anemia, bruising, easy bleeding, night sweats, petechiae, history of DVT/PE or cancer               Allergic/Immunologic: Negative for anaphylaxis, itchy/ teary eyes, itching, sneezing, swelling     Physical Exam  Constitutional: Well developed, awake/alert/oriented x3, no distress, alert and cooperative             Eyes: Sclera anicteric, no conjunctival inflammation, conjugate gaze    ENMT: mucous membranes moist, no apparent injury,            Head/Neck: Neck supple, no apparent injury, No JVD, trachea midline, no bruits              Respiratory/Thorax: Patent airways, CTAB, normal breath sounds with good chest expansion, thorax symmetric         Cardiovascular: Regular, rate and rhythm, no murmurs, normal S1 and S2         Gastrointestinal: Nondistended, soft, non-tender, no rebound tenderness or guarding, no masses palpable, no organomegaly, +BS, no bruits , well healed pfannensteil              Extremities: normal extremities, no cyanosis edema, contusions or wounds            Neurological: alert and oriented x3, normal strength, Normal gait          Lymphatic: No palpable inguinal lymphadenopathy   Psychological: Appropriate mood and behavior         Skin: Warm and dry, no lesions, no rashes                   Impression:  Pt with likely pancreatic insufficiency, likely scar from prior CULLEN - she is so much better from the creon - I think  that surgery would likely be more harmful than beneficial currently     Plan:    Colonoscopy in 01/26   Continue creon/lomotil

## 2025-05-21 ENCOUNTER — OFFICE VISIT (OUTPATIENT)
Dept: SURGERY | Facility: CLINIC | Age: 75
End: 2025-05-21
Payer: MEDICARE

## 2025-05-21 VITALS
SYSTOLIC BLOOD PRESSURE: 183 MMHG | OXYGEN SATURATION: 96 % | TEMPERATURE: 97.7 F | DIASTOLIC BLOOD PRESSURE: 84 MMHG | WEIGHT: 115 LBS | BODY MASS INDEX: 23.23 KG/M2 | HEART RATE: 58 BPM

## 2025-05-21 DIAGNOSIS — K59.1 FUNCTIONAL DIARRHEA: Primary | ICD-10-CM

## 2025-05-21 PROCEDURE — 3079F DIAST BP 80-89 MM HG: CPT | Performed by: COLON & RECTAL SURGERY

## 2025-05-21 PROCEDURE — 3077F SYST BP >= 140 MM HG: CPT | Performed by: COLON & RECTAL SURGERY

## 2025-05-21 PROCEDURE — 1159F MED LIST DOCD IN RCRD: CPT | Performed by: COLON & RECTAL SURGERY

## 2025-05-21 PROCEDURE — 1036F TOBACCO NON-USER: CPT | Performed by: COLON & RECTAL SURGERY

## 2025-05-21 PROCEDURE — 1125F AMNT PAIN NOTED PAIN PRSNT: CPT | Performed by: COLON & RECTAL SURGERY

## 2025-05-21 PROCEDURE — 99213 OFFICE O/P EST LOW 20 MIN: CPT | Performed by: COLON & RECTAL SURGERY

## 2025-05-21 ASSESSMENT — PAIN SCALES - GENERAL: PAINLEVEL_OUTOF10: 3

## 2025-05-27 ENCOUNTER — OFFICE VISIT (OUTPATIENT)
Dept: GASTROENTEROLOGY | Facility: CLINIC | Age: 75
End: 2025-05-27
Payer: MEDICARE

## 2025-05-27 VITALS
BODY MASS INDEX: 23.79 KG/M2 | HEART RATE: 58 BPM | HEIGHT: 59 IN | DIASTOLIC BLOOD PRESSURE: 83 MMHG | WEIGHT: 118 LBS | TEMPERATURE: 97.9 F | SYSTOLIC BLOOD PRESSURE: 191 MMHG

## 2025-05-27 DIAGNOSIS — R19.7 DIARRHEA, UNSPECIFIED TYPE: Primary | ICD-10-CM

## 2025-05-27 DIAGNOSIS — R19.7 DIARRHEA, UNSPECIFIED TYPE: ICD-10-CM

## 2025-05-27 DIAGNOSIS — K86.89 PANCREATIC INSUFFICIENCY (HHS-HCC): ICD-10-CM

## 2025-05-27 DIAGNOSIS — K21.9 GASTROESOPHAGEAL REFLUX DISEASE WITHOUT ESOPHAGITIS: ICD-10-CM

## 2025-05-27 DIAGNOSIS — K86.89 PANCREATIC INSUFFICIENCY (HHS-HCC): Primary | ICD-10-CM

## 2025-05-27 PROCEDURE — RXMED WILLOW AMBULATORY MEDICATION CHARGE

## 2025-05-27 PROCEDURE — 99212 OFFICE O/P EST SF 10 MIN: CPT | Performed by: INTERNAL MEDICINE

## 2025-05-27 PROCEDURE — 1160F RVW MEDS BY RX/DR IN RCRD: CPT | Performed by: INTERNAL MEDICINE

## 2025-05-27 PROCEDURE — 3077F SYST BP >= 140 MM HG: CPT | Performed by: INTERNAL MEDICINE

## 2025-05-27 PROCEDURE — 3079F DIAST BP 80-89 MM HG: CPT | Performed by: INTERNAL MEDICINE

## 2025-05-27 PROCEDURE — 1159F MED LIST DOCD IN RCRD: CPT | Performed by: INTERNAL MEDICINE

## 2025-05-27 RX ORDER — DIPHENOXYLATE HYDROCHLORIDE AND ATROPINE SULFATE 2.5; .025 MG/1; MG/1
1 TABLET ORAL
Qty: 90 TABLET | Refills: 3 | Status: SHIPPED | OUTPATIENT
Start: 2025-05-27 | End: 2026-05-27

## 2025-05-27 RX ORDER — DIPHENOXYLATE HYDROCHLORIDE AND ATROPINE SULFATE 2.5; .025 MG/1; MG/1
1 TABLET ORAL 4 TIMES DAILY PRN
COMMUNITY
End: 2025-05-27 | Stop reason: SDUPTHER

## 2025-05-27 RX ORDER — OMEPRAZOLE 20 MG/1
20 CAPSULE, DELAYED RELEASE ORAL
Qty: 90 CAPSULE | Refills: 3 | Status: SHIPPED | OUTPATIENT
Start: 2025-05-27 | End: 2026-05-27

## 2025-05-27 NOTE — PROGRESS NOTES
Subjective   Patient ID: Viky Smith is a 75 y.o. female.  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 Mercado - August 2024        Review of Systems    Objective   Physical Exam    Assessment/Plan   Diagnoses and all orders for this visit:  Pancreatic insufficiency ([facility])  Diarrhea, unspecified type  Gastroesophageal reflux disease without esophagitis  -     omeprazole (PriLOSEC) 20 mg DR capsule; Take 1 capsule (20 mg) by mouth once daily in the morning. Take before meals. Do not crush or chew.    Overall you are doing very well  Please remember that the issues you had were hard to find and caused significant stress    Tony Moss, DO

## 2025-05-28 ENCOUNTER — SPECIALTY PHARMACY (OUTPATIENT)
Dept: PHARMACY | Facility: CLINIC | Age: 75
End: 2025-05-28

## 2025-05-28 ENCOUNTER — TELEPHONE (OUTPATIENT)
Dept: GASTROENTEROLOGY | Facility: HOSPITAL | Age: 75
End: 2025-05-28
Payer: MEDICARE

## 2025-05-28 NOTE — TELEPHONE ENCOUNTER
Pharmacy is calling because patient has been prescribed   Clorizepate .375mg daily from her PCP for anxiety.  You had sent a script for Lomotil to the pharmacy  Clorizepate and Lomotil are both controlled meds.  Is that ok with you?  Pharmacy: [phone]

## 2025-06-10 ENCOUNTER — PHARMACY VISIT (OUTPATIENT)
Dept: PHARMACY | Facility: CLINIC | Age: 75
End: 2025-06-10
Payer: COMMERCIAL

## 2025-06-25 ENCOUNTER — APPOINTMENT (OUTPATIENT)
Dept: SURGERY | Facility: CLINIC | Age: 75
End: 2025-06-25
Payer: MEDICARE

## 2025-07-30 PROCEDURE — RXMED WILLOW AMBULATORY MEDICATION CHARGE

## 2025-08-05 ENCOUNTER — SPECIALTY PHARMACY (OUTPATIENT)
Dept: PHARMACY | Facility: CLINIC | Age: 75
End: 2025-08-05

## 2025-08-08 ENCOUNTER — PHARMACY VISIT (OUTPATIENT)
Dept: PHARMACY | Facility: CLINIC | Age: 75
End: 2025-08-08
Payer: COMMERCIAL

## 2025-09-16 ENCOUNTER — APPOINTMENT (OUTPATIENT)
Dept: GASTROENTEROLOGY | Facility: CLINIC | Age: 75
End: 2025-09-16
Payer: MEDICARE

## 2025-09-23 ENCOUNTER — APPOINTMENT (OUTPATIENT)
Dept: GASTROENTEROLOGY | Facility: CLINIC | Age: 75
End: 2025-09-23
Payer: MEDICARE

## (undated) DEVICE — DRAPE CARM MINI FOR IMAG SYS INSIGHT FLROSCN

## (undated) DEVICE — ARM CRADLE: Brand: DEROYAL

## (undated) DEVICE — SCREW BNE L22MM DIA2.7MM CORT DST RAD NONLOCKING FULL THRD
Type: IMPLANTABLE DEVICE | Site: WRIST | Status: NON-FUNCTIONAL
Removed: 2018-05-30

## (undated) DEVICE — MASTISOL ADHESIVE LIQ 2/3ML

## (undated) DEVICE — PADDING,UNDERCAST,COTTON, 4"X4YD STERILE: Brand: MEDLINE

## (undated) DEVICE — PADDING,UNDERCAST,COTTON, 3X4YD STERILE: Brand: MEDLINE

## (undated) DEVICE — SUTURE FIBERLOOP 4-0 L10IN NONABSORBABLE BLU L17.9MM 3/8 AR722920

## (undated) DEVICE — PADDING CAST W4INXL4YD SPUN DACRON POLY POR SYN VERSATILE

## (undated) DEVICE — SINGLE USE DEVICE INTENDED TO COVER EXPOSED ENDS OF ORTHOPEDIC PIN AND K-WIRES TO HELP PROTECT THE EXPOSED WIRE FROM SNAGGING ON CLOTHING.: Brand: OXBORO™ PIN COVER

## (undated) DEVICE — COVER HNDL LT DISP

## (undated) DEVICE — TOWEL,OR,DSP,ST,BLUE,STD,6/PK,12PK/CS: Brand: MEDLINE

## (undated) DEVICE — K WIRE FIX DIA1.6MM S STL FOR DST VOLAR PLATING SYS
Type: IMPLANTABLE DEVICE | Site: WRIST | Status: NON-FUNCTIONAL
Removed: 2018-05-30

## (undated) DEVICE — SOLUTION IV IRRIG POUR BRL 0.9% SODIUM CHL 2F7124

## (undated) DEVICE — Device

## (undated) DEVICE — ZIMMER® STERILE DISPOSABLE TOURNIQUET CUFF WITH PLC, DUAL PORT, SINGLE BLADDER, 18 IN. (46 CM)

## (undated) DEVICE — SURGICAL PROCEDURE PACK HND

## (undated) DEVICE — SCREW BNE L16MM DIA2.7MM DST VOLAR RAD NONLOCKING FULL THRD
Type: IMPLANTABLE DEVICE | Site: WRIST | Status: NON-FUNCTIONAL
Removed: 2018-05-30

## (undated) DEVICE — DOUBLE BASIN SET: Brand: MEDLINE INDUSTRIES, INC.

## (undated) DEVICE — BIT DRL DIA2.2MM CROSSLOCK MOD TY DVR ANAT VOLAR PLATING

## (undated) DEVICE — 4-PORT MANIFOLD: Brand: NEPTUNE 2